# Patient Record
Sex: FEMALE | Race: WHITE | Employment: FULL TIME | URBAN - METROPOLITAN AREA
[De-identification: names, ages, dates, MRNs, and addresses within clinical notes are randomized per-mention and may not be internally consistent; named-entity substitution may affect disease eponyms.]

---

## 2020-01-30 ENCOUNTER — APPOINTMENT (OUTPATIENT)
Dept: GENERAL RADIOLOGY | Facility: HOSPITAL | Age: 50
DRG: 534 | End: 2020-01-30
Attending: NURSE PRACTITIONER
Payer: OTHER MISCELLANEOUS

## 2020-01-30 ENCOUNTER — HOSPITAL ENCOUNTER (INPATIENT)
Facility: HOSPITAL | Age: 50
LOS: 1 days | Discharge: ANOTHER HOSPITAL | DRG: 534 | End: 2020-01-31
Attending: INTERNAL MEDICINE | Admitting: INTERNAL MEDICINE
Payer: OTHER MISCELLANEOUS

## 2020-01-30 DIAGNOSIS — S82.832A CLOSED FRACTURE OF DISTAL END OF LEFT FIBULA, UNSPECIFIED FRACTURE MORPHOLOGY, INITIAL ENCOUNTER: ICD-10-CM

## 2020-01-30 DIAGNOSIS — S72.402A CLOSED FRACTURE OF DISTAL END OF LEFT FEMUR, UNSPECIFIED FRACTURE MORPHOLOGY, INITIAL ENCOUNTER (HCC): Primary | ICD-10-CM

## 2020-01-30 LAB
ANION GAP SERPL CALC-SCNC: 2 MMOL/L (ref 0–18)
BASOPHILS # BLD AUTO: 0.08 X10(3) UL (ref 0–0.2)
BASOPHILS NFR BLD AUTO: 0.4 %
BUN BLD-MCNC: 19 MG/DL (ref 7–18)
BUN/CREAT SERPL: 20.2 (ref 10–20)
CALCIUM BLD-MCNC: 9.1 MG/DL (ref 8.5–10.1)
CHLORIDE SERPL-SCNC: 111 MMOL/L (ref 98–112)
CO2 SERPL-SCNC: 25 MMOL/L (ref 21–32)
CREAT BLD-MCNC: 0.94 MG/DL (ref 0.55–1.02)
DEPRECATED RDW RBC AUTO: 39.5 FL (ref 35.1–46.3)
EOSINOPHIL # BLD AUTO: 0.12 X10(3) UL (ref 0–0.7)
EOSINOPHIL NFR BLD AUTO: 0.7 %
ERYTHROCYTE [DISTWIDTH] IN BLOOD BY AUTOMATED COUNT: 13.2 % (ref 11–15)
GLUCOSE BLD-MCNC: 150 MG/DL (ref 70–99)
HCT VFR BLD AUTO: 42.2 % (ref 35–48)
HGB BLD-MCNC: 14 G/DL (ref 12–16)
IMM GRANULOCYTES # BLD AUTO: 0.08 X10(3) UL (ref 0–1)
IMM GRANULOCYTES NFR BLD: 0.4 %
LYMPHOCYTES # BLD AUTO: 1.29 X10(3) UL (ref 1–4)
LYMPHOCYTES NFR BLD AUTO: 7.1 %
MCH RBC QN AUTO: 27.3 PG (ref 26–34)
MCHC RBC AUTO-ENTMCNC: 33.2 G/DL (ref 31–37)
MCV RBC AUTO: 82.4 FL (ref 80–100)
MONOCYTES # BLD AUTO: 0.66 X10(3) UL (ref 0.1–1)
MONOCYTES NFR BLD AUTO: 3.6 %
NEUTROPHILS # BLD AUTO: 16.05 X10 (3) UL (ref 1.5–7.7)
NEUTROPHILS # BLD AUTO: 16.05 X10(3) UL (ref 1.5–7.7)
NEUTROPHILS NFR BLD AUTO: 87.8 %
OSMOLALITY SERPL CALC.SUM OF ELEC: 291 MOSM/KG (ref 275–295)
PLATELET # BLD AUTO: 286 10(3)UL (ref 150–450)
POTASSIUM SERPL-SCNC: 4.7 MMOL/L (ref 3.5–5.1)
RBC # BLD AUTO: 5.12 X10(6)UL (ref 3.8–5.3)
SODIUM SERPL-SCNC: 138 MMOL/L (ref 136–145)
WBC # BLD AUTO: 18.3 X10(3) UL (ref 4–11)

## 2020-01-30 PROCEDURE — 93010 ELECTROCARDIOGRAM REPORT: CPT | Performed by: INTERNAL MEDICINE

## 2020-01-30 PROCEDURE — 96374 THER/PROPH/DIAG INJ IV PUSH: CPT

## 2020-01-30 PROCEDURE — 73610 X-RAY EXAM OF ANKLE: CPT | Performed by: NURSE PRACTITIONER

## 2020-01-30 PROCEDURE — 80048 BASIC METABOLIC PNL TOTAL CA: CPT | Performed by: NURSE PRACTITIONER

## 2020-01-30 PROCEDURE — 73560 X-RAY EXAM OF KNEE 1 OR 2: CPT | Performed by: NURSE PRACTITIONER

## 2020-01-30 PROCEDURE — 99285 EMERGENCY DEPT VISIT HI MDM: CPT

## 2020-01-30 PROCEDURE — 73502 X-RAY EXAM HIP UNI 2-3 VIEWS: CPT | Performed by: NURSE PRACTITIONER

## 2020-01-30 PROCEDURE — 85025 COMPLETE CBC W/AUTO DIFF WBC: CPT | Performed by: NURSE PRACTITIONER

## 2020-01-30 PROCEDURE — 93005 ELECTROCARDIOGRAM TRACING: CPT

## 2020-01-30 PROCEDURE — 73552 X-RAY EXAM OF FEMUR 2/>: CPT | Performed by: NURSE PRACTITIONER

## 2020-01-30 RX ORDER — IBUPROFEN 600 MG/1
600 TABLET ORAL ONCE
Status: DISCONTINUED | OUTPATIENT
Start: 2020-01-30 | End: 2020-01-30

## 2020-01-30 RX ORDER — ONDANSETRON 2 MG/ML
4 INJECTION INTRAMUSCULAR; INTRAVENOUS EVERY 6 HOURS PRN
Status: DISCONTINUED | OUTPATIENT
Start: 2020-01-30 | End: 2020-01-31

## 2020-01-30 RX ORDER — IBUPROFEN 600 MG/1
600 TABLET ORAL EVERY 6 HOURS PRN
Status: DISCONTINUED | OUTPATIENT
Start: 2020-01-30 | End: 2020-01-31

## 2020-01-30 RX ORDER — IPRATROPIUM BROMIDE AND ALBUTEROL SULFATE 2.5; .5 MG/3ML; MG/3ML
3 SOLUTION RESPIRATORY (INHALATION) EVERY 6 HOURS PRN
Status: DISCONTINUED | OUTPATIENT
Start: 2020-01-30 | End: 2020-01-31

## 2020-01-30 RX ORDER — LABETALOL HYDROCHLORIDE 5 MG/ML
10 INJECTION, SOLUTION INTRAVENOUS EVERY 4 HOURS PRN
Status: DISCONTINUED | OUTPATIENT
Start: 2020-01-30 | End: 2020-01-31

## 2020-01-30 RX ORDER — MONTELUKAST SODIUM 10 MG/1
10 TABLET ORAL DAILY
COMMUNITY
Start: 2019-11-06

## 2020-01-30 RX ORDER — FLUTICASONE PROPIONATE AND SALMETEROL 250; 50 UG/1; UG/1
1 POWDER RESPIRATORY (INHALATION) DAILY
COMMUNITY

## 2020-01-30 RX ORDER — MONTELUKAST SODIUM 10 MG/1
10 TABLET ORAL DAILY
Status: DISCONTINUED | OUTPATIENT
Start: 2020-01-30 | End: 2020-01-31

## 2020-01-30 RX ORDER — METOCLOPRAMIDE HYDROCHLORIDE 5 MG/ML
10 INJECTION INTRAMUSCULAR; INTRAVENOUS EVERY 8 HOURS PRN
Status: DISCONTINUED | OUTPATIENT
Start: 2020-01-30 | End: 2020-01-31

## 2020-01-30 RX ORDER — HYDROMORPHONE HYDROCHLORIDE 1 MG/ML
0.4 INJECTION, SOLUTION INTRAMUSCULAR; INTRAVENOUS; SUBCUTANEOUS EVERY 2 HOUR PRN
Status: DISCONTINUED | OUTPATIENT
Start: 2020-01-30 | End: 2020-01-31

## 2020-01-30 RX ORDER — BISACODYL 10 MG
10 SUPPOSITORY, RECTAL RECTAL
Status: DISCONTINUED | OUTPATIENT
Start: 2020-01-30 | End: 2020-01-31

## 2020-01-30 RX ORDER — TEMAZEPAM 7.5 MG/1
7.5 CAPSULE ORAL NIGHTLY PRN
Status: DISCONTINUED | OUTPATIENT
Start: 2020-01-30 | End: 2020-01-31

## 2020-01-30 RX ORDER — HYDROMORPHONE HYDROCHLORIDE 1 MG/ML
0.2 INJECTION, SOLUTION INTRAMUSCULAR; INTRAVENOUS; SUBCUTANEOUS EVERY 2 HOUR PRN
Status: DISCONTINUED | OUTPATIENT
Start: 2020-01-30 | End: 2020-01-31

## 2020-01-30 RX ORDER — MORPHINE SULFATE 4 MG/ML
4 INJECTION, SOLUTION INTRAMUSCULAR; INTRAVENOUS ONCE
Status: COMPLETED | OUTPATIENT
Start: 2020-01-30 | End: 2020-01-30

## 2020-01-30 RX ORDER — POLYETHYLENE GLYCOL 3350 17 G/17G
17 POWDER, FOR SOLUTION ORAL 2 TIMES DAILY
Status: DISCONTINUED | OUTPATIENT
Start: 2020-01-31 | End: 2020-01-31

## 2020-01-30 RX ORDER — SODIUM CHLORIDE 0.9 % (FLUSH) 0.9 %
3 SYRINGE (ML) INJECTION AS NEEDED
Status: DISCONTINUED | OUTPATIENT
Start: 2020-01-30 | End: 2020-01-31

## 2020-01-30 RX ORDER — IBUPROFEN 600 MG/1
600 TABLET ORAL ONCE
Status: COMPLETED | OUTPATIENT
Start: 2020-01-30 | End: 2020-01-30

## 2020-01-30 RX ORDER — HYDROCODONE BITARTRATE AND ACETAMINOPHEN 5; 325 MG/1; MG/1
2 TABLET ORAL EVERY 4 HOURS PRN
Status: DISCONTINUED | OUTPATIENT
Start: 2020-01-30 | End: 2020-01-31

## 2020-01-30 RX ORDER — ALBUTEROL SULFATE 2.5 MG/3ML
2.5 SOLUTION RESPIRATORY (INHALATION) EVERY 6 HOURS PRN
COMMUNITY

## 2020-01-30 RX ORDER — SODIUM PHOSPHATE, DIBASIC AND SODIUM PHOSPHATE, MONOBASIC 7; 19 G/133ML; G/133ML
1 ENEMA RECTAL ONCE AS NEEDED
Status: DISCONTINUED | OUTPATIENT
Start: 2020-01-30 | End: 2020-01-31

## 2020-01-30 RX ORDER — HYDROCODONE BITARTRATE AND ACETAMINOPHEN 5; 325 MG/1; MG/1
1 TABLET ORAL EVERY 4 HOURS PRN
Status: DISCONTINUED | OUTPATIENT
Start: 2020-01-30 | End: 2020-01-31

## 2020-01-30 RX ORDER — ACETAMINOPHEN 325 MG/1
650 TABLET ORAL EVERY 4 HOURS PRN
Status: DISCONTINUED | OUTPATIENT
Start: 2020-01-30 | End: 2020-01-31

## 2020-01-30 RX ORDER — HYDROMORPHONE HYDROCHLORIDE 1 MG/ML
0.8 INJECTION, SOLUTION INTRAMUSCULAR; INTRAVENOUS; SUBCUTANEOUS EVERY 2 HOUR PRN
Status: DISCONTINUED | OUTPATIENT
Start: 2020-01-30 | End: 2020-01-31

## 2020-01-30 RX ORDER — HYDRALAZINE HYDROCHLORIDE 20 MG/ML
10 INJECTION INTRAMUSCULAR; INTRAVENOUS EVERY 4 HOURS PRN
Status: DISCONTINUED | OUTPATIENT
Start: 2020-01-30 | End: 2020-01-31

## 2020-01-30 NOTE — ED NOTES
Pt in XR at this time. Pain to left hip, femur, knee and ankle after fall at work. Previous fracture noted to area. Scar in place after surgery. Imaging in process. Will medicate with ibuprofen.  at bedside. Ice in place.

## 2020-01-31 ENCOUNTER — ANESTHESIA (OUTPATIENT)
Dept: SURGERY | Facility: HOSPITAL | Age: 50
DRG: 534 | End: 2020-01-31
Payer: OTHER MISCELLANEOUS

## 2020-01-31 ENCOUNTER — ANESTHESIA EVENT (OUTPATIENT)
Dept: SURGERY | Facility: HOSPITAL | Age: 50
DRG: 534 | End: 2020-01-31
Payer: OTHER MISCELLANEOUS

## 2020-01-31 ENCOUNTER — HOSPITAL ENCOUNTER (INPATIENT)
Facility: HOSPITAL | Age: 50
LOS: 10 days | Discharge: HOME | DRG: 481 | End: 2020-02-10
Attending: INTERNAL MEDICINE | Admitting: INTERNAL MEDICINE
Payer: MEDICAID

## 2020-01-31 ENCOUNTER — APPOINTMENT (OUTPATIENT)
Dept: CT IMAGING | Facility: HOSPITAL | Age: 50
DRG: 534 | End: 2020-01-31
Attending: HOSPITALIST
Payer: OTHER MISCELLANEOUS

## 2020-01-31 VITALS
RESPIRATION RATE: 16 BRPM | DIASTOLIC BLOOD PRESSURE: 72 MMHG | OXYGEN SATURATION: 96 % | SYSTOLIC BLOOD PRESSURE: 152 MMHG | HEART RATE: 80 BPM | HEIGHT: 62 IN | BODY MASS INDEX: 41.38 KG/M2 | TEMPERATURE: 98 F | WEIGHT: 224.88 LBS

## 2020-01-31 PROBLEM — S72.90XA FEMUR FRACTURE (HCC): Status: ACTIVE | Noted: 2020-01-31

## 2020-01-31 LAB
ANION GAP SERPL CALC-SCNC: 4 MMOL/L (ref 0–18)
ANTIBODY SCREEN: NEGATIVE
B-HCG UR QL: NEGATIVE
BASOPHILS # BLD AUTO: 0.06 X10(3) UL (ref 0–0.2)
BASOPHILS NFR BLD AUTO: 0.7 %
BUN BLD-MCNC: 17 MG/DL (ref 7–18)
BUN/CREAT SERPL: 26.6 (ref 10–20)
CALCIUM BLD-MCNC: 8.4 MG/DL (ref 8.5–10.1)
CHLORIDE SERPL-SCNC: 110 MMOL/L (ref 98–112)
CO2 SERPL-SCNC: 28 MMOL/L (ref 21–32)
CREAT BLD-MCNC: 0.64 MG/DL (ref 0.55–1.02)
DEPRECATED RDW RBC AUTO: 40.5 FL (ref 35.1–46.3)
EOSINOPHIL # BLD AUTO: 0.19 X10(3) UL (ref 0–0.7)
EOSINOPHIL NFR BLD AUTO: 2.1 %
ERYTHROCYTE [DISTWIDTH] IN BLOOD BY AUTOMATED COUNT: 13.4 % (ref 11–15)
GLUCOSE BLD-MCNC: 99 MG/DL (ref 70–99)
HAV IGM SER QL: 2 MG/DL (ref 1.6–2.6)
HCT VFR BLD AUTO: 37.9 % (ref 35–48)
HGB BLD-MCNC: 12.3 G/DL (ref 12–16)
IMM GRANULOCYTES # BLD AUTO: 0.03 X10(3) UL (ref 0–1)
IMM GRANULOCYTES NFR BLD: 0.3 %
INR BLD: 1.05 (ref 0.9–1.2)
LYMPHOCYTES # BLD AUTO: 1.83 X10(3) UL (ref 1–4)
LYMPHOCYTES NFR BLD AUTO: 20.1 %
MCH RBC QN AUTO: 27 PG (ref 26–34)
MCHC RBC AUTO-ENTMCNC: 32.5 G/DL (ref 31–37)
MCV RBC AUTO: 83.3 FL (ref 80–100)
MONOCYTES # BLD AUTO: 0.8 X10(3) UL (ref 0.1–1)
MONOCYTES NFR BLD AUTO: 8.8 %
MRSA DNA SPEC QL NAA+PROBE: NEGATIVE
NEUTROPHILS # BLD AUTO: 6.19 X10 (3) UL (ref 1.5–7.7)
NEUTROPHILS # BLD AUTO: 6.19 X10(3) UL (ref 1.5–7.7)
NEUTROPHILS NFR BLD AUTO: 68 %
OSMOLALITY SERPL CALC.SUM OF ELEC: 296 MOSM/KG (ref 275–295)
PLATELET # BLD AUTO: 246 10(3)UL (ref 150–450)
POTASSIUM SERPL-SCNC: 3.7 MMOL/L (ref 3.5–5.1)
PROTHROMBIN TIME: 13.5 SECONDS (ref 11.8–14.5)
RBC # BLD AUTO: 4.55 X10(6)UL (ref 3.8–5.3)
RH BLOOD TYPE: POSITIVE
SODIUM SERPL-SCNC: 142 MMOL/L (ref 136–145)
WBC # BLD AUTO: 9.1 X10(3) UL (ref 4–11)

## 2020-01-31 PROCEDURE — 83735 ASSAY OF MAGNESIUM: CPT | Performed by: INTERNAL MEDICINE

## 2020-01-31 PROCEDURE — 86850 RBC ANTIBODY SCREEN: CPT | Performed by: INTERNAL MEDICINE

## 2020-01-31 PROCEDURE — 81025 URINE PREGNANCY TEST: CPT | Performed by: INTERNAL MEDICINE

## 2020-01-31 PROCEDURE — 80048 BASIC METABOLIC PNL TOTAL CA: CPT | Performed by: INTERNAL MEDICINE

## 2020-01-31 PROCEDURE — 86901 BLOOD TYPING SEROLOGIC RH(D): CPT | Performed by: INTERNAL MEDICINE

## 2020-01-31 PROCEDURE — 85610 PROTHROMBIN TIME: CPT | Performed by: INTERNAL MEDICINE

## 2020-01-31 PROCEDURE — 73701 CT LOWER EXTREMITY W/DYE: CPT | Performed by: HOSPITALIST

## 2020-01-31 PROCEDURE — 87641 MR-STAPH DNA AMP PROBE: CPT | Performed by: INTERNAL MEDICINE

## 2020-01-31 PROCEDURE — 85025 COMPLETE CBC W/AUTO DIFF WBC: CPT | Performed by: INTERNAL MEDICINE

## 2020-01-31 PROCEDURE — 86900 BLOOD TYPING SEROLOGIC ABO: CPT | Performed by: INTERNAL MEDICINE

## 2020-01-31 RX ORDER — MORPHINE SULFATE 2 MG/ML
2 INJECTION, SOLUTION INTRAMUSCULAR; INTRAVENOUS EVERY 10 MIN PRN
Status: CANCELLED | OUTPATIENT
Start: 2020-01-31

## 2020-01-31 RX ORDER — MORPHINE SULFATE 4 MG/ML
4 INJECTION, SOLUTION INTRAMUSCULAR; INTRAVENOUS EVERY 2 HOUR PRN
Status: DISCONTINUED | OUTPATIENT
Start: 2020-01-31 | End: 2020-02-10

## 2020-01-31 RX ORDER — PROCHLORPERAZINE EDISYLATE 5 MG/ML
5 INJECTION INTRAMUSCULAR; INTRAVENOUS ONCE AS NEEDED
Status: CANCELLED | OUTPATIENT
Start: 2020-01-31 | End: 2020-01-31

## 2020-01-31 RX ORDER — SODIUM CHLORIDE, SODIUM LACTATE, POTASSIUM CHLORIDE, CALCIUM CHLORIDE 600; 310; 30; 20 MG/100ML; MG/100ML; MG/100ML; MG/100ML
INJECTION, SOLUTION INTRAVENOUS CONTINUOUS
Status: DISCONTINUED | OUTPATIENT
Start: 2020-01-31 | End: 2020-01-31

## 2020-01-31 RX ORDER — MONTELUKAST SODIUM 10 MG/1
10 TABLET ORAL NIGHTLY
Status: DISCONTINUED | OUTPATIENT
Start: 2020-01-31 | End: 2020-02-10

## 2020-01-31 RX ORDER — ACETAMINOPHEN 500 MG
1000 TABLET ORAL ONCE
Status: COMPLETED | OUTPATIENT
Start: 2020-01-31 | End: 2020-01-31

## 2020-01-31 RX ORDER — SODIUM CHLORIDE 9 MG/ML
INJECTION, SOLUTION INTRAVENOUS CONTINUOUS
Status: DISCONTINUED | OUTPATIENT
Start: 2020-01-31 | End: 2020-02-10

## 2020-01-31 RX ORDER — SODIUM CHLORIDE, SODIUM LACTATE, POTASSIUM CHLORIDE, CALCIUM CHLORIDE 600; 310; 30; 20 MG/100ML; MG/100ML; MG/100ML; MG/100ML
INJECTION, SOLUTION INTRAVENOUS CONTINUOUS
Status: CANCELLED | OUTPATIENT
Start: 2020-01-31

## 2020-01-31 RX ORDER — HYDROMORPHONE HYDROCHLORIDE 1 MG/ML
0.4 INJECTION, SOLUTION INTRAMUSCULAR; INTRAVENOUS; SUBCUTANEOUS EVERY 5 MIN PRN
Status: CANCELLED | OUTPATIENT
Start: 2020-01-31

## 2020-01-31 RX ORDER — ALBUTEROL SULFATE 2.5 MG/3ML
2.5 SOLUTION RESPIRATORY (INHALATION) EVERY 6 HOURS PRN
Status: DISCONTINUED | OUTPATIENT
Start: 2020-01-31 | End: 2020-02-10

## 2020-01-31 RX ORDER — HALOPERIDOL 5 MG/ML
0.25 INJECTION INTRAMUSCULAR ONCE AS NEEDED
Status: CANCELLED | OUTPATIENT
Start: 2020-01-31 | End: 2020-01-31

## 2020-01-31 RX ORDER — ONDANSETRON 2 MG/ML
4 INJECTION INTRAMUSCULAR; INTRAVENOUS EVERY 6 HOURS PRN
Status: DISCONTINUED | OUTPATIENT
Start: 2020-01-31 | End: 2020-02-01

## 2020-01-31 RX ORDER — METOCLOPRAMIDE 10 MG/1
10 TABLET ORAL ONCE
Status: COMPLETED | OUTPATIENT
Start: 2020-01-31 | End: 2020-01-31

## 2020-01-31 RX ORDER — MORPHINE SULFATE 10 MG/ML
6 INJECTION, SOLUTION INTRAMUSCULAR; INTRAVENOUS EVERY 10 MIN PRN
Status: CANCELLED | OUTPATIENT
Start: 2020-01-31

## 2020-01-31 RX ORDER — ONDANSETRON 2 MG/ML
4 INJECTION INTRAMUSCULAR; INTRAVENOUS ONCE AS NEEDED
Status: CANCELLED | OUTPATIENT
Start: 2020-01-31 | End: 2020-01-31

## 2020-01-31 RX ORDER — FAMOTIDINE 20 MG/1
20 TABLET ORAL ONCE
Status: COMPLETED | OUTPATIENT
Start: 2020-01-31 | End: 2020-01-31

## 2020-01-31 RX ORDER — HYDROMORPHONE HYDROCHLORIDE 1 MG/ML
0.2 INJECTION, SOLUTION INTRAMUSCULAR; INTRAVENOUS; SUBCUTANEOUS EVERY 5 MIN PRN
Status: CANCELLED | OUTPATIENT
Start: 2020-01-31

## 2020-01-31 RX ORDER — MORPHINE SULFATE 4 MG/ML
4 INJECTION, SOLUTION INTRAMUSCULAR; INTRAVENOUS EVERY 10 MIN PRN
Status: CANCELLED | OUTPATIENT
Start: 2020-01-31

## 2020-01-31 RX ORDER — HYDROCODONE BITARTRATE AND ACETAMINOPHEN 5; 325 MG/1; MG/1
1 TABLET ORAL EVERY 4 HOURS PRN
Status: DISCONTINUED | OUTPATIENT
Start: 2020-01-31 | End: 2020-02-10

## 2020-01-31 RX ORDER — CEFAZOLIN SODIUM/WATER 2 G/20 ML
2 SYRINGE (ML) INTRAVENOUS ONCE
Status: COMPLETED | OUTPATIENT
Start: 2020-02-01 | End: 2020-02-01

## 2020-01-31 RX ORDER — METOCLOPRAMIDE HYDROCHLORIDE 5 MG/ML
10 INJECTION INTRAMUSCULAR; INTRAVENOUS EVERY 8 HOURS PRN
Status: DISCONTINUED | OUTPATIENT
Start: 2020-01-31 | End: 2020-02-10

## 2020-01-31 RX ORDER — NALOXONE HYDROCHLORIDE 0.4 MG/ML
80 INJECTION, SOLUTION INTRAMUSCULAR; INTRAVENOUS; SUBCUTANEOUS AS NEEDED
Status: CANCELLED | OUTPATIENT
Start: 2020-01-31 | End: 2020-01-31

## 2020-01-31 RX ORDER — BISACODYL 10 MG
10 SUPPOSITORY, RECTAL RECTAL
Status: DISCONTINUED | OUTPATIENT
Start: 2020-01-31 | End: 2020-02-01

## 2020-01-31 RX ORDER — ACETAMINOPHEN 325 MG/1
650 TABLET ORAL EVERY 4 HOURS PRN
Status: DISCONTINUED | OUTPATIENT
Start: 2020-01-31 | End: 2020-02-10

## 2020-01-31 RX ORDER — HYDROMORPHONE HYDROCHLORIDE 1 MG/ML
0.6 INJECTION, SOLUTION INTRAMUSCULAR; INTRAVENOUS; SUBCUTANEOUS EVERY 5 MIN PRN
Status: CANCELLED | OUTPATIENT
Start: 2020-01-31

## 2020-01-31 RX ORDER — SODIUM PHOSPHATE, DIBASIC AND SODIUM PHOSPHATE, MONOBASIC 7; 19 G/133ML; G/133ML
1 ENEMA RECTAL ONCE AS NEEDED
Status: DISCONTINUED | OUTPATIENT
Start: 2020-01-31 | End: 2020-02-01

## 2020-01-31 RX ORDER — POLYETHYLENE GLYCOL 3350 17 G/17G
17 POWDER, FOR SOLUTION ORAL DAILY PRN
Status: DISCONTINUED | OUTPATIENT
Start: 2020-01-31 | End: 2020-02-01

## 2020-01-31 RX ORDER — MORPHINE SULFATE 4 MG/ML
1 INJECTION, SOLUTION INTRAMUSCULAR; INTRAVENOUS EVERY 2 HOUR PRN
Status: DISCONTINUED | OUTPATIENT
Start: 2020-01-31 | End: 2020-02-10

## 2020-01-31 RX ORDER — HYDROCODONE BITARTRATE AND ACETAMINOPHEN 5; 325 MG/1; MG/1
2 TABLET ORAL EVERY 4 HOURS PRN
Status: DISCONTINUED | OUTPATIENT
Start: 2020-01-31 | End: 2020-02-10

## 2020-01-31 RX ORDER — HYDROCODONE BITARTRATE AND ACETAMINOPHEN 5; 325 MG/1; MG/1
1 TABLET ORAL AS NEEDED
Status: CANCELLED | OUTPATIENT
Start: 2020-01-31

## 2020-01-31 RX ORDER — HYDROCODONE BITARTRATE AND ACETAMINOPHEN 5; 325 MG/1; MG/1
2 TABLET ORAL AS NEEDED
Status: CANCELLED | OUTPATIENT
Start: 2020-01-31

## 2020-01-31 RX ORDER — DOCUSATE SODIUM 100 MG/1
100 CAPSULE, LIQUID FILLED ORAL 2 TIMES DAILY
Status: DISCONTINUED | OUTPATIENT
Start: 2020-01-31 | End: 2020-02-01

## 2020-01-31 RX ORDER — MORPHINE SULFATE 4 MG/ML
2 INJECTION, SOLUTION INTRAMUSCULAR; INTRAVENOUS EVERY 2 HOUR PRN
Status: DISCONTINUED | OUTPATIENT
Start: 2020-01-31 | End: 2020-02-10

## 2020-01-31 NOTE — PLAN OF CARE
Problem: Patient Centered Care  Goal: Patient preferences are identified and integrated in the patient's plan of care  Description  Interventions:  - What would you like us to know as we care for you? I live with my  and have no children.  I work a hematoma  - Assess quality of pulses, skin color and temperature  - Assess for signs of decreased coronary artery perfusion - ex.  Angina  - Evaluate fluid balance, assess for edema, trend weights  Outcome: Progressing     Problem: METABOLIC/FLUID AND ELECT integrity remains intact  Description  INTERVENTIONS  - Assess and document risk factors for pressure ulcer development  - Assess and document skin integrity  - Monitor for areas of redness and/or skin breakdown  - Initiate interventions, skin care algorit

## 2020-01-31 NOTE — H&P
[facility] Hospitalist H&P       CC: Patient presents with:  Fall       PCP: Oliver Giron MD    History of Present Illness: Patient is a 44year old female with PMH sig for asthma controlled with advair and singular, and hs of left femur fracture at age 11, wh Throat: Lips, mucosa, and tongue normal. Teeth and gums normal.   Neck: Supple    Lungs:   Clear to auscultation bilaterally. Normal effort   Chest wall:  No tenderness or deformity. Heart:  Regular rate and rhythm,     Abdomen:   Soft, non-tender.  [place] Xr Knee (1 Or 2 Views), Left (cpt=73560)    Result Date: 1/30/2020  CONCLUSION:  1. Please see combined report with the x-ray of the left femur of the same day.     Dictated by (CST): Nikki Jimenes MD on 1/30/2020 at 18:55     Approved by (CST): Rome Lantigua patient's clinical course.   [facility] hospitalist to continue to follow patient while in house    Patient and/or patient's family given opportunity to ask questions and note understanding and agreeing with therapeutic plan as outlined    Thank Christine Jansen MD

## 2020-01-31 NOTE — CONSULTS
[facility]  Orthopedic Surgery  Report of Consultation    Beatrice Pimentel Patient Status:  Inpatient    1970 MRN [id number]   Location [facility] 4W/SW/SE Attending Kike Castillo MD   Hosp Day # 1 PCP Latoya Lozano MD     Reason for C 11/6/19, End Date , Taking? , Authorizing Provider External/Patient, Reported        Current Facility-Administered Medications:   •  lactated ringers infusion, , Intravenous, Continuous  •  ceFAZolin in NS (ANCEF) 3g/100mL IVPB premix, 3 g, Intravenous, On systems was negative.     Physical Exam:  NAD, AOx3  Left femur:  Mild swelling, pain with any left leg ROM  Firing quadriceps  Grossly NVID    Left ankle:  Splint in place  Mild tenderness and swelling overlying lateral malleolus  Wiggles toes  SILT over t

## 2020-01-31 NOTE — ED NOTES
MD Yesika Friedman at bedside to inform pt of the results. Last PO intake was 1:45pm and a sip of water with motrin. Pain unchanged.

## 2020-01-31 NOTE — OR PREOP
Patient in pre-op being sent up to 413, per  case was cancelled. Report was [facility] to [facility] [id number]. Family with patient at bedside, aware of plan. Patient vitals stable, no pain.

## 2020-01-31 NOTE — PLAN OF CARE
Pt npo for OR, had CT of left leg first. Pt denies pain, left leg in post-mold with ace wrap, CMS good to toes. Family at bedside and went along with to preop holding.        Problem: Patient Centered Care  Goal: Patient preferences are identified and integ decreased cardiac output  - Evaluate effectiveness of vasoactive medications to optimize hemodynamic stability  - Monitor arterial and/or venous puncture sites for bleeding and/or hematoma  - Assess quality of pulses, skin color and temperature  - Assess f Fluid restriction as ordered  - Instruct patient on fluid and nutrition restrictions as appropriate  Outcome: Progressing     Problem: SKIN/TISSUE INTEGRITY - ADULT  Goal: Skin integrity remains intact  Description  INTERVENTIONS  - Assess and document ris

## 2020-01-31 NOTE — ANESTHESIA PREPROCEDURE EVALUATION
Anesthesia PreOp Note    HPI:     Vin Sharp is a 44year old female who presents for preoperative consultation requested by: Orvan Hodgkin, MD    Date of Surgery: 1/30/2020 - 1/31/2020    Procedure(s):   FEMUR OPEN REDUCTION INTERNAL FIXATION/ EX FIX  A [MAR Hold] acetaminophen (TYLENOL) tab 650 mg, 650 mg, Oral, Q4H PRN, Alan Walker DO, 650 mg at 01/30/20 2326    Or  [MAR Hold] HYDROcodone-acetaminophen (NORCO) 5-325 MG per tab 1 tablet, 1 tablet, Oral, Q4H PRN, Alan Walker DO    Or  Ifeanyi Esteves Years of education: Not on file      Highest education level: Not on file    Occupational History      Not on file    Social Needs      Financial resource strain: Not on file      Food insecurity:        Worry: Not on file        Inability: Not on file Body mass index is 41.28 kg/m². height is 1.575 m (5' 2\") and weight is 102.4 kg (225 lb 11.2 oz). Her oral temperature is 98.3 °F (36.8 °C). Her blood pressure is 153/72 and her pulse is 84. Her respiration is 14 and oxygen saturation is 95%.     01/31/

## 2020-01-31 NOTE — CM/SW NOTE
[facility] Transfer  CM was notified by Dr. Monnie Goodell to assist in facilitating transfer to a higher level of care.   I spoke with Bettylou Paget at [facility] (ext [phone]) to begin coronation of transfer and for patient care to be assumed under Dr. Mitzi Caicedo

## 2020-01-31 NOTE — ED NOTES
Followed up with inpatient nurse regarding inpatient bed. No bed to room. Awaiting bed to transfer pt to pod 5.

## 2020-01-31 NOTE — DISCHARGE SUMMARY
General Medicine Discharge Summary     Patient ID:  Roxie Davis  44year old  5/12/1970    Admit date: 1/30/2020    Discharge date and time: 1/31/2020    Attending Physician: Sheela Rollins MD     Consults: IP CONSULT TO HOSPITALIST  IP CONSULT TO [address] [place] [address] fracture with poss pathologic fracture  - Noted on xray, poss pathologic fx per radiology, CT without evidence of lesions but notable fx near existing plate, per ortho recommended surgery with Dr. Divya Ambrose at [facility], plan for tx to [facility] for further m 18:54          Xr Knee (1 Or 2 Views), Left (cpt=73560)    Result Date: 1/30/2020  CONCLUSION:  1. Please see combined report with the x-ray of the left femur of the same day.     Dictated by (CST): Johann Andres MD on 1/30/2020 at 18:55     Approved by (C Care: Greater than 30 minutes    Patient had opportunity to ask questions and state understand and agree with therapeutic plan as outlined    Thank Mary Anthony M.D.  [facility] Hospitalist  Pager

## 2020-01-31 NOTE — ED PROVIDER NOTES
Patient Seen in: [facility] AND [facility] Emergency Department      History   Patient presents with:  Fall    Stated Complaint: fall    HPI    42-year-old female, with history of asthma, presents to the emergency department by EMS after a slip and fall at work. Rate and Rhythm: Normal rate. Pulses: Normal pulses. Heart sounds: No murmur. Pulmonary:      Effort: Pulmonary effort is normal.      Breath sounds: Normal breath sounds. Abdominal:      Palpations: Abdomen is soft. Tenderness:  There i CBC W/ DIFFERENTIAL[[id number]]          Abnormal            Final result                 Please view results for these tests on the individual orders.      Declines pain medication states was given ibuprofen by the nurse  X-rays are pending femur x-ray as diagnosis)  Closed fracture of distal end of left fibula, unspecified fracture morphology, initial encounter    Disposition:  Admit  1/30/2020  8:12 pm    Follow-up:  No follow-up provider specified.       Medications Prescribed:  Current Discharge [facility] none

## 2020-02-01 ENCOUNTER — APPOINTMENT (OUTPATIENT)
Dept: GENERAL RADIOLOGY | Facility: HOSPITAL | Age: 50
DRG: 481 | End: 2020-02-01
Attending: ORTHOPAEDIC SURGERY
Payer: MEDICAID

## 2020-02-01 LAB
ANION GAP SERPL CALC-SCNC: 5 MMOL/L (ref 0–18)
BASOPHILS # BLD AUTO: 0.06 X10(3) UL (ref 0–0.2)
BASOPHILS NFR BLD AUTO: 0.6 %
BUN BLD-MCNC: 17 MG/DL (ref 7–18)
BUN/CREAT SERPL: 28.8 (ref 10–20)
CALCIUM BLD-MCNC: 9.3 MG/DL (ref 8.5–10.1)
CHLORIDE SERPL-SCNC: 110 MMOL/L (ref 98–112)
CO2 SERPL-SCNC: 26 MMOL/L (ref 21–32)
CREAT BLD-MCNC: 0.59 MG/DL (ref 0.55–1.02)
DEPRECATED RDW RBC AUTO: 40.3 FL (ref 35.1–46.3)
EOSINOPHIL # BLD AUTO: 0.31 X10(3) UL (ref 0–0.7)
EOSINOPHIL NFR BLD AUTO: 3.3 %
ERYTHROCYTE [DISTWIDTH] IN BLOOD BY AUTOMATED COUNT: 13.2 % (ref 11–15)
GLUCOSE BLD-MCNC: 94 MG/DL (ref 70–99)
HAV IGM SER QL: 2.1 MG/DL (ref 1.6–2.6)
HCT VFR BLD AUTO: 39.5 % (ref 35–48)
HGB BLD-MCNC: 13 G/DL (ref 12–16)
IMM GRANULOCYTES # BLD AUTO: 0.05 X10(3) UL (ref 0–1)
IMM GRANULOCYTES NFR BLD: 0.5 %
INR BLD: 0.98 (ref 0.9–1.1)
LYMPHOCYTES # BLD AUTO: 1.69 X10(3) UL (ref 1–4)
LYMPHOCYTES NFR BLD AUTO: 17.8 %
MCH RBC QN AUTO: 27.5 PG (ref 26–34)
MCHC RBC AUTO-ENTMCNC: 32.9 G/DL (ref 31–37)
MCV RBC AUTO: 83.7 FL (ref 80–100)
MONOCYTES # BLD AUTO: 0.84 X10(3) UL (ref 0.1–1)
MONOCYTES NFR BLD AUTO: 8.8 %
NEUTROPHILS # BLD AUTO: 6.55 X10 (3) UL (ref 1.5–7.7)
NEUTROPHILS # BLD AUTO: 6.55 X10(3) UL (ref 1.5–7.7)
NEUTROPHILS NFR BLD AUTO: 69 %
OSMOLALITY SERPL CALC.SUM OF ELEC: 293 MOSM/KG (ref 275–295)
PLATELET # BLD AUTO: 239 10(3)UL (ref 150–450)
POTASSIUM SERPL-SCNC: 3.7 MMOL/L (ref 3.5–5.1)
PSA SERPL DL<=0.01 NG/ML-MCNC: 13.4 SECONDS (ref 12.5–14.7)
RBC # BLD AUTO: 4.72 X10(6)UL (ref 3.8–5.3)
SODIUM SERPL-SCNC: 141 MMOL/L (ref 136–145)
WBC # BLD AUTO: 9.5 X10(3) UL (ref 4–11)

## 2020-02-01 PROCEDURE — 0QSC06Z REPOSITION LEFT LOWER FEMUR WITH INTRAMEDULLARY INTERNAL FIXATION DEVICE, OPEN APPROACH: ICD-10-PCS | Performed by: ORTHOPAEDIC SURGERY

## 2020-02-01 PROCEDURE — 83735 ASSAY OF MAGNESIUM: CPT | Performed by: HOSPITALIST

## 2020-02-01 PROCEDURE — 88300 SURGICAL PATH GROSS: CPT | Performed by: ORTHOPAEDIC SURGERY

## 2020-02-01 PROCEDURE — 85025 COMPLETE CBC W/AUTO DIFF WBC: CPT | Performed by: INTERNAL MEDICINE

## 2020-02-01 PROCEDURE — 94640 AIRWAY INHALATION TREATMENT: CPT

## 2020-02-01 PROCEDURE — 0QPC04Z REMOVAL OF INTERNAL FIXATION DEVICE FROM LEFT LOWER FEMUR, OPEN APPROACH: ICD-10-PCS | Performed by: ORTHOPAEDIC SURGERY

## 2020-02-01 PROCEDURE — 2W3RX2Z IMMOBILIZATION OF LEFT LOWER LEG USING CAST: ICD-10-PCS | Performed by: ORTHOPAEDIC SURGERY

## 2020-02-01 PROCEDURE — 80048 BASIC METABOLIC PNL TOTAL CA: CPT | Performed by: ORTHOPAEDIC SURGERY

## 2020-02-01 PROCEDURE — 85610 PROTHROMBIN TIME: CPT | Performed by: ORTHOPAEDIC SURGERY

## 2020-02-01 PROCEDURE — 3E0R3BZ INTRODUCTION OF ANESTHETIC AGENT INTO SPINAL CANAL, PERCUTANEOUS APPROACH: ICD-10-PCS | Performed by: ANESTHESIOLOGY

## 2020-02-01 PROCEDURE — 76000 FLUOROSCOPY <1 HR PHYS/QHP: CPT | Performed by: ORTHOPAEDIC SURGERY

## 2020-02-01 DEVICE — SCREW CORT FA 5.0X35 Z NAIL: Type: IMPLANTABLE DEVICE | Site: FEMUR | Status: FUNCTIONAL

## 2020-02-01 RX ORDER — DEXTROSE AND SODIUM CHLORIDE 5; .45 G/100ML; G/100ML
INJECTION, SOLUTION INTRAVENOUS CONTINUOUS
Status: DISCONTINUED | OUTPATIENT
Start: 2020-02-01 | End: 2020-02-10

## 2020-02-01 RX ORDER — NALOXONE HYDROCHLORIDE 0.4 MG/ML
80 INJECTION, SOLUTION INTRAMUSCULAR; INTRAVENOUS; SUBCUTANEOUS AS NEEDED
Status: DISCONTINUED | OUTPATIENT
Start: 2020-02-01 | End: 2020-02-01 | Stop reason: HOSPADM

## 2020-02-01 RX ORDER — SODIUM CHLORIDE, SODIUM LACTATE, POTASSIUM CHLORIDE, CALCIUM CHLORIDE 600; 310; 30; 20 MG/100ML; MG/100ML; MG/100ML; MG/100ML
INJECTION, SOLUTION INTRAVENOUS CONTINUOUS
Status: DISCONTINUED | OUTPATIENT
Start: 2020-02-01 | End: 2020-02-01 | Stop reason: HOSPADM

## 2020-02-01 RX ORDER — KETOROLAC TROMETHAMINE 15 MG/ML
15 INJECTION, SOLUTION INTRAMUSCULAR; INTRAVENOUS EVERY 6 HOURS
Status: COMPLETED | OUTPATIENT
Start: 2020-02-01 | End: 2020-02-02

## 2020-02-01 RX ORDER — HYDROMORPHONE HYDROCHLORIDE 1 MG/ML
0.8 INJECTION, SOLUTION INTRAMUSCULAR; INTRAVENOUS; SUBCUTANEOUS EVERY 2 HOUR PRN
Status: DISCONTINUED | OUTPATIENT
Start: 2020-02-01 | End: 2020-02-01

## 2020-02-01 RX ORDER — ACETAMINOPHEN 325 MG/1
650 TABLET ORAL 4 TIMES DAILY
Status: DISCONTINUED | OUTPATIENT
Start: 2020-02-01 | End: 2020-02-01

## 2020-02-01 RX ORDER — OXYCODONE HYDROCHLORIDE 15 MG/1
15 TABLET ORAL EVERY 4 HOURS PRN
Status: DISCONTINUED | OUTPATIENT
Start: 2020-02-01 | End: 2020-02-01

## 2020-02-01 RX ORDER — SODIUM CHLORIDE, SODIUM LACTATE, POTASSIUM CHLORIDE, CALCIUM CHLORIDE 600; 310; 30; 20 MG/100ML; MG/100ML; MG/100ML; MG/100ML
INJECTION, SOLUTION INTRAVENOUS CONTINUOUS PRN
Status: DISCONTINUED | OUTPATIENT
Start: 2020-02-01 | End: 2020-02-01 | Stop reason: SURG

## 2020-02-01 RX ORDER — TIZANIDINE 4 MG/1
4 TABLET ORAL 3 TIMES DAILY PRN
Status: DISCONTINUED | OUTPATIENT
Start: 2020-02-01 | End: 2020-02-01

## 2020-02-01 RX ORDER — TIZANIDINE 2 MG/1
2 TABLET ORAL 3 TIMES DAILY PRN
Status: DISCONTINUED | OUTPATIENT
Start: 2020-02-01 | End: 2020-02-10

## 2020-02-01 RX ORDER — HYDROMORPHONE HYDROCHLORIDE 1 MG/ML
0.4 INJECTION, SOLUTION INTRAMUSCULAR; INTRAVENOUS; SUBCUTANEOUS EVERY 2 HOUR PRN
Status: DISCONTINUED | OUTPATIENT
Start: 2020-02-01 | End: 2020-02-01

## 2020-02-01 RX ORDER — OXYCODONE HYDROCHLORIDE 10 MG/1
10 TABLET ORAL EVERY 4 HOURS PRN
Status: DISCONTINUED | OUTPATIENT
Start: 2020-02-01 | End: 2020-02-01

## 2020-02-01 RX ORDER — ONDANSETRON 2 MG/ML
4 INJECTION INTRAMUSCULAR; INTRAVENOUS EVERY 6 HOURS PRN
Status: DISCONTINUED | OUTPATIENT
Start: 2020-02-01 | End: 2020-02-10

## 2020-02-01 RX ORDER — SODIUM PHOSPHATE, DIBASIC AND SODIUM PHOSPHATE, MONOBASIC 7; 19 G/133ML; G/133ML
1 ENEMA RECTAL ONCE AS NEEDED
Status: DISCONTINUED | OUTPATIENT
Start: 2020-02-01 | End: 2020-02-10

## 2020-02-01 RX ORDER — POLYETHYLENE GLYCOL 3350 17 G/17G
17 POWDER, FOR SOLUTION ORAL DAILY PRN
Status: DISCONTINUED | OUTPATIENT
Start: 2020-02-01 | End: 2020-02-10

## 2020-02-01 RX ORDER — HYDROMORPHONE HYDROCHLORIDE 1 MG/ML
0.2 INJECTION, SOLUTION INTRAMUSCULAR; INTRAVENOUS; SUBCUTANEOUS EVERY 2 HOUR PRN
Status: DISCONTINUED | OUTPATIENT
Start: 2020-02-01 | End: 2020-02-01

## 2020-02-01 RX ORDER — ONDANSETRON 2 MG/ML
4 INJECTION INTRAMUSCULAR; INTRAVENOUS AS NEEDED
Status: DISCONTINUED | OUTPATIENT
Start: 2020-02-01 | End: 2020-02-01 | Stop reason: HOSPADM

## 2020-02-01 RX ORDER — MIDAZOLAM HYDROCHLORIDE 1 MG/ML
1 INJECTION INTRAMUSCULAR; INTRAVENOUS EVERY 5 MIN PRN
Status: DISCONTINUED | OUTPATIENT
Start: 2020-02-01 | End: 2020-02-01 | Stop reason: HOSPADM

## 2020-02-01 RX ORDER — BISACODYL 10 MG
10 SUPPOSITORY, RECTAL RECTAL
Status: DISCONTINUED | OUTPATIENT
Start: 2020-02-01 | End: 2020-02-10

## 2020-02-01 RX ORDER — DOCUSATE SODIUM 100 MG/1
100 CAPSULE, LIQUID FILLED ORAL 2 TIMES DAILY
Status: DISCONTINUED | OUTPATIENT
Start: 2020-02-01 | End: 2020-02-10

## 2020-02-01 RX ORDER — ACETAMINOPHEN 325 MG/1
650 TABLET ORAL ONCE
Status: DISCONTINUED | OUTPATIENT
Start: 2020-02-01 | End: 2020-02-01 | Stop reason: HOSPADM

## 2020-02-01 RX ORDER — POTASSIUM CHLORIDE 20 MEQ/1
40 TABLET, EXTENDED RELEASE ORAL ONCE
Status: COMPLETED | OUTPATIENT
Start: 2020-02-01 | End: 2020-02-01

## 2020-02-01 RX ORDER — CEFAZOLIN SODIUM/WATER 2 G/20 ML
2 SYRINGE (ML) INTRAVENOUS EVERY 8 HOURS
Status: COMPLETED | OUTPATIENT
Start: 2020-02-01 | End: 2020-02-02

## 2020-02-01 RX ORDER — MIDAZOLAM HYDROCHLORIDE 1 MG/ML
INJECTION INTRAMUSCULAR; INTRAVENOUS AS NEEDED
Status: DISCONTINUED | OUTPATIENT
Start: 2020-02-01 | End: 2020-02-01 | Stop reason: SURG

## 2020-02-01 RX ORDER — OXYCODONE HYDROCHLORIDE 5 MG/1
5 TABLET ORAL EVERY 4 HOURS PRN
Status: DISCONTINUED | OUTPATIENT
Start: 2020-02-01 | End: 2020-02-01

## 2020-02-01 RX ORDER — HYDROCODONE BITARTRATE AND ACETAMINOPHEN 5; 325 MG/1; MG/1
2 TABLET ORAL EVERY 4 HOURS PRN
Qty: 40 TABLET | Refills: 0 | Status: SHIPPED | OUTPATIENT
Start: 2020-02-01

## 2020-02-01 RX ORDER — DIPHENHYDRAMINE HYDROCHLORIDE 50 MG/ML
12.5 INJECTION INTRAMUSCULAR; INTRAVENOUS AS NEEDED
Status: DISCONTINUED | OUTPATIENT
Start: 2020-02-01 | End: 2020-02-01 | Stop reason: HOSPADM

## 2020-02-01 RX ORDER — MEPERIDINE HYDROCHLORIDE 25 MG/ML
12.5 INJECTION INTRAMUSCULAR; INTRAVENOUS; SUBCUTANEOUS AS NEEDED
Status: DISCONTINUED | OUTPATIENT
Start: 2020-02-01 | End: 2020-02-01 | Stop reason: HOSPADM

## 2020-02-01 RX ORDER — HYDROMORPHONE HYDROCHLORIDE 1 MG/ML
0.4 INJECTION, SOLUTION INTRAMUSCULAR; INTRAVENOUS; SUBCUTANEOUS EVERY 5 MIN PRN
Status: DISCONTINUED | OUTPATIENT
Start: 2020-02-01 | End: 2020-02-01 | Stop reason: HOSPADM

## 2020-02-01 RX ADMIN — SODIUM CHLORIDE, SODIUM LACTATE, POTASSIUM CHLORIDE, CALCIUM CHLORIDE: 600; 310; 30; 20 INJECTION, SOLUTION INTRAVENOUS at 12:30:00

## 2020-02-01 RX ADMIN — MIDAZOLAM HYDROCHLORIDE 2 MG: 1 INJECTION INTRAMUSCULAR; INTRAVENOUS at 11:21:00

## 2020-02-01 RX ADMIN — SODIUM CHLORIDE, SODIUM LACTATE, POTASSIUM CHLORIDE, CALCIUM CHLORIDE: 600; 310; 30; 20 INJECTION, SOLUTION INTRAVENOUS at 13:42:00

## 2020-02-01 RX ADMIN — CEFAZOLIN SODIUM/WATER 2 G: 2 G/20 ML SYRINGE (ML) INTRAVENOUS at 11:30:00

## 2020-02-01 RX ADMIN — SODIUM CHLORIDE, SODIUM LACTATE, POTASSIUM CHLORIDE, CALCIUM CHLORIDE: 600; 310; 30; 20 INJECTION, SOLUTION INTRAVENOUS at 11:20:00

## 2020-02-01 NOTE — ANESTHESIA PROCEDURE NOTES
Spinal Block  Performed by: Miryam Camarillo MD  Authorized by: Miryam Camarillo MD       General Information and Staff    Start Time:   Anesthesiologist: Miryam Camarillo MD  Performed by:   Anesthesiologist  Site identification: surface landmarks    Pr

## 2020-02-01 NOTE — ANESTHESIA POSTPROCEDURE EVALUATION
[facility] Patient Status:  Inpatient   Age/Gender 44year old female MRN [id number]   [facility] SURGERY Attending Jasmeet Roth, [address] Day # 1 PCP Arik Prescott MD       Anesthesia Post-op Note    Procedur

## 2020-02-01 NOTE — PLAN OF CARE
Received a phone call from Byhalia at [facility] stating that a white jacket was found in the patient's room.  Patient stated that the jacket was hers, and that she will have a family member  the jacket at [address] [place] [address]

## 2020-02-01 NOTE — BRIEF OP NOTE
Pre-Operative Diagnosis: PT COMING FROM [place]     Post-Operative Diagnosis: PT COMING FROM [facility]      Procedure Performed:   Procedure(s):  LEFT FEMUR, REMOVAL OF HARDWARE, RETROGRADE RODDING    Surgeon(s) and Role:     Nieves Morales MD - Primary

## 2020-02-01 NOTE — PLAN OF CARE
Patient is slow to respond. Patient's mother rode with patient in the ambulance from [facility]. Patient's mother became very upset when patient's  arrived, stating, Denilson Marquis is this man here? \" Asked patient's  to step out of the room fo

## 2020-02-01 NOTE — PLAN OF CARE
Patient transferred from [place] with a distal femur fracture. Will require removal of hardware and retrograde rodding to stabilize the fracture.   Patient scheduled for surgery in the am

## 2020-02-01 NOTE — PLAN OF CARE
Patient being transferred to [facility].   Problem: Patient Centered Care  Goal: Patient preferences are identified and integrated in the patient's plan of care  Description  Interventions:  - What would you like us to know as we care for you?  I live and/or venous puncture sites for bleeding and/or hematoma  - Assess quality of pulses, skin color and temperature  - Assess for signs of decreased coronary artery perfusion - ex.  Angina  - Evaluate fluid balance, assess for edema, trend weights  Outcome: P Problem: SKIN/TISSUE INTEGRITY - ADULT  Goal: Skin integrity remains intact  Description  INTERVENTIONS  - Assess and document risk factors for pressure ulcer development  - Assess and document skin integrity  - Monitor for areas of redness and/or skin b

## 2020-02-01 NOTE — ANESTHESIA PROCEDURE NOTES
Regional Block  Performed by: Lyndon Rose MD  Authorized by: Lyndon Rose MD       General Information and Staff    Start Time:  2/1/2020 1:40 PM  End Time:  2/1/2020 1:46 PM  Anesthesiologist:  Lyndon Rose MD  Patient Location:  OR      S

## 2020-02-01 NOTE — PLAN OF CARE
Patient admitted via ambulance from [facility] to [address] [place] [address]. Accompanied by mother. Patient is alert and oriented to person, place, time and situation. On room air with continuous pulse oximetry monitoring. Saline lock noted.  Celestino fallon

## 2020-02-01 NOTE — ANESTHESIA PREPROCEDURE EVALUATION
PRE-OP EVALUATION    Patient Name: Caleb Kayser    Pre-op Diagnosis: PT COMING FROM [place]    Procedure(s):  LEFT FEMUR, REMOVAL OF HARDWARE, RETROGRADE RODDING    Surgeon(s) and Role:     Mercedes Latif MD - Primary    Pre-op vitals reviewed.   Tem solution 2.5 mg, 2.5 mg, Nebulization, Q6H PRN  Fluticasone Furoate-Vilanterol (BREO ELLIPTA) 200-25 MCG/INH inhaler 1 puff, 1 puff, Inhalation, Daily  Montelukast Sodium (SINGULAIR) tab 10 mg, 10 mg, Oral, Nightly  ceFAZolin sodium (ANCEF/KEFZOL) 2 GM/20M Date    INR 0.98 02/01/2020         Airway      Mallampati: II  Mouth opening: >3 FB  TM distance: > 6 cm  Neck ROM: full Cardiovascular    Cardiovascular exam normal.         Dental    No notable dental history.          Pulmonary    Pulmonary exam normal.

## 2020-02-01 NOTE — CM/SW NOTE
Received call from RN, stating patients mother is roaming halls and entering other patient's rooms. Nursing supervisor advised RN call SW. CM advised RN call family to  patient's mother. She ultimately left with other daughter Garry Halls.  It is likely

## 2020-02-01 NOTE — PLAN OF CARE
Minimal pain pre op to left leg  Post op pain well controlled  Orders for partial weight bearing  Cast to LLE  Bleeding noted post op to left upper incision. Pressure dressing applied. Paged Dr. Avila Situ at 1600.  No call back  Continued to monitor area for bl

## 2020-02-01 NOTE — PLAN OF CARE
Patient's mother was found wandering the hallway by another RN, Jennifer Barrett, and was not able to verbalize why she was here or who she was looking for or if she was a patient at this hospital. This RN accompanied the patient's mother back to the patient's room and

## 2020-02-01 NOTE — H&P
[facility] Hospitalist H&P       CC: transfer from [facility] for L femur fx    PCP: Odalys Salgado MD    History of Present Illness: Pt is a 45 yo with asthma, hx L femur fx at age 11 requiring surgery, morbid obesity with BMI 41 who is admitted as transfer Neck: Supple, symmetrical   Lungs: No wheezing, diminished. Normal effort   Chest wall:  No tenderness or deformity. Heart:  Regular rate and rhythm, S1, S2 normal,no LE edema   Abdomen:   Soft, non-tender.  Bowel sounds normal. . Non distended, no ov 1/30/2020 at 18:49     Approved by (CST): La Galvin MD on 1/30/2020 at 18:54           other imaging reviewed -see epic    ASSESSMENT / PLAN:       43 yo with asthma, hx L femur fx at age 11 requiring surgery, morbid obesity with BMI 41 who is admitte

## 2020-02-01 NOTE — PROGRESS NOTES
Received called from Donte, nursing coordinator for [facility].     Patient being transferred via [address] [place] [address]. Going to Room: 382 Ortho/Spine Unit. Will call report to [facility]   # [phone]. Family aware.

## 2020-02-01 NOTE — PROGRESS NOTES
FAMILY SOCIAL ISSUES    Per night nurse  Patient's mother annat came with patient via ambulance from [place] [address]  When patients  arrived mother did not recognize  (mother lives with patient and )  Luzmaria Heller (mother) was found wondering halls if patient has any mental disability issues we should be aware about. Dimitrios Agarwal stated Servando Hairston was slow as a child\"      also noted to have tremors to hands that he did not before.  When asked if he was ok he stated \"I just have not taken my medication

## 2020-02-01 NOTE — H&P
[facility]    PATIENT'S NAME: Aaliyah Thao   ATTENDING PHYSICIAN: Rea Olvera.  Scotty Aguilar MD   PATIENT ACCOUNT#:   [id number]    LOCATION:  OR [facility] OR [facility]  MEDICAL RECORD #:   [id number]       YOB: 1970  ADMISSION DATE: She is in moderate distress. She is overweight. She has a BMI of 41. 13. VITAL SIGNS:  Stable. The patient is 5 feet 2 inches. She weighs 225 pounds. HEENT:  Unremarkable. LUNGS:  Clear. HEART:  Normal S1, S2 without murmur. ABDOMEN:  Benign.   EXTR

## 2020-02-01 NOTE — PLAN OF CARE
Plan of care explained and updated with patient input. Progressing per plan of care. Patient is alert and oriented to person, place, time and situation. Mother and  at bedside. IVF infusing as ordered. Tirado draining clear/yellow urine.  NPO status m

## 2020-02-02 LAB
DEPRECATED RDW RBC AUTO: 40.6 FL (ref 35.1–46.3)
ERYTHROCYTE [DISTWIDTH] IN BLOOD BY AUTOMATED COUNT: 13.2 % (ref 11–15)
HCT VFR BLD AUTO: 33 % (ref 35–48)
HGB BLD-MCNC: 10.4 G/DL (ref 12–16)
MCH RBC QN AUTO: 27 PG (ref 26–34)
MCHC RBC AUTO-ENTMCNC: 31.5 G/DL (ref 31–37)
MCV RBC AUTO: 85.7 FL (ref 80–100)
PLATELET # BLD AUTO: 191 10(3)UL (ref 150–450)
POTASSIUM SERPL-SCNC: 3.9 MMOL/L (ref 3.5–5.1)
RBC # BLD AUTO: 3.85 X10(6)UL (ref 3.8–5.3)
WBC # BLD AUTO: 9.9 X10(3) UL (ref 4–11)

## 2020-02-02 PROCEDURE — 97530 THERAPEUTIC ACTIVITIES: CPT

## 2020-02-02 PROCEDURE — 97161 PT EVAL LOW COMPLEX 20 MIN: CPT

## 2020-02-02 PROCEDURE — 84132 ASSAY OF SERUM POTASSIUM: CPT | Performed by: ORTHOPAEDIC SURGERY

## 2020-02-02 PROCEDURE — 97535 SELF CARE MNGMENT TRAINING: CPT

## 2020-02-02 PROCEDURE — 85027 COMPLETE CBC AUTOMATED: CPT | Performed by: ORTHOPAEDIC SURGERY

## 2020-02-02 PROCEDURE — 97166 OT EVAL MOD COMPLEX 45 MIN: CPT

## 2020-02-02 NOTE — PLAN OF CARE
Received in bed resting. Discussed plan of care. Medicated for pain with scheduled pain medication. Left lower leg in short cast. Left upper leg with pressure dressing and ace bandage on. Right foot has heel protector on.  Instructed to call for all needs a

## 2020-02-02 NOTE — PROGRESS NOTES
Post Op Day 1 Ortho Note: Left Femur, removal of hardware, retrograde rodding    Status Post Nerve Block:  Type of Nerve Block: Left Femoral  Single Injection Nerve Block    Post op review: No evidence of immediate block related complications, No paresthes

## 2020-02-02 NOTE — OCCUPATIONAL THERAPY NOTE
OCCUPATIONAL THERAPY EVALUATION - INPATIENT     Room Number: 382/382-A  Evaluation Date: 2/2/2020  Type of Evaluation: Initial  Presenting Problem: s/p L hardware removal and retrograde rodding 2/1/2020     Physician Order: IP Consult to Occupational Thera leg  Management Techniques: Activity promotion; Body mechanics;Breathing techniques;Relaxation;Repositioning    COGNITION  Overall Cognitive Status:  WFL - within functional limits    VISION  Current Vision: no visual deficits    PERCEPTION  Overall Percept RW and following WB status with mod assist, therapist assist pt to complete donning of shoes with max assist.  Extensive education provided to family about D/C rec, questions asked all answered.   Patient End of Session: Up in chair;Needs met;Call light wit techniques;ADL training;IADL training;Continued evaluation; Compensatory technique education;Equipment eval/education;Patient/Family training;Patient/Family education; Endurance training;UE strengthening/ROM; Functional transfer training  Rehab Potential : Go

## 2020-02-02 NOTE — PROGRESS NOTES
[facility]  Orthopedic Surgery  Progress Note    Beatrice Pimentel Patient Status:  Inpatient    1970 MRN [id number]   [facility] 3SW-A Attending Rocio Wright, *   Hosp Day # 2 PCP Latoya Lozano MD     SUBJECTIVE:  IN management      REGAN Murphy  2/2/2020  9:02 AM

## 2020-02-02 NOTE — PROGRESS NOTES
[facility] Hospitalist Progress Note     PCP: Akilah Jin MD    CC:  Follow up    SUBJECTIVE:  Had removal of hardware and rodding of L femur yesterday. Pain manageable. No cp/sob/n/v/f/c. Tiardo with yellow urine. +flatus    OBJECTIVE:  Temp:  [97.8 °F (36. PEG 3350, magnesium hydroxide, bisacodyl, Fleet Enema, ondansetron HCl, tiZANidine HCl, acetaminophen **OR** HYDROcodone-acetaminophen **OR** HYDROcodone-acetaminophen, Metoclopramide HCl, morphINE sulfate **OR** morphINE sulfate **OR** morphINE sulfat

## 2020-02-02 NOTE — PHYSICAL THERAPY NOTE
PHYSICAL THERAPY EVALUATION - INPATIENT     Room Number: 382/382-A  Evaluation Date: 2/2/2020  Type of Evaluation: Initial  Physician Order: PT Eval and Treat    Presenting Problem: s/p L hardware removal and retrograde rodding 2/1/2020   Reason for None  Fall Risk: Standard fall risk    WEIGHT BEARING RESTRICTION  Weight Bearing Restriction: L lower extremity           L Lower Extremity: Partial Weight Bearing    PAIN ASSESSMENT  Ratin  Location: L LE  Management Techniques: Activity promotion; Re LE)  Stoop/Curb Assistance: Not tested  Comment : above defined by FIM score    Skilled Therapy Provided: per RN pt ok to be seen. Pt received sitting in bedside chair and agreeable to therapy.   Pt  and mother in room during eval.  Pt educated on g following impairments decrease strength, decrease functional mobility skills, decrease balance, and abnormal gait. Based on this evaluation, patient's clinical presentation is stable and overall the evaluation complexity is considered low.   These impairme

## 2020-02-03 PROBLEM — Z47.89 ORTHOPEDIC AFTERCARE: Status: ACTIVE | Noted: 2020-02-03

## 2020-02-03 LAB
DEPRECATED RDW RBC AUTO: 39.3 FL (ref 35.1–46.3)
ERYTHROCYTE [DISTWIDTH] IN BLOOD BY AUTOMATED COUNT: 13.1 % (ref 11–15)
HCT VFR BLD AUTO: 32.2 % (ref 35–48)
HGB BLD-MCNC: 10.4 G/DL (ref 12–16)
MCH RBC QN AUTO: 27 PG (ref 26–34)
MCHC RBC AUTO-ENTMCNC: 32.3 G/DL (ref 31–37)
MCV RBC AUTO: 83.6 FL (ref 80–100)
PLATELET # BLD AUTO: 185 10(3)UL (ref 150–450)
RBC # BLD AUTO: 3.85 X10(6)UL (ref 3.8–5.3)
WBC # BLD AUTO: 10.7 X10(3) UL (ref 4–11)

## 2020-02-03 PROCEDURE — 97530 THERAPEUTIC ACTIVITIES: CPT

## 2020-02-03 PROCEDURE — 97535 SELF CARE MNGMENT TRAINING: CPT

## 2020-02-03 PROCEDURE — 85027 COMPLETE CBC AUTOMATED: CPT | Performed by: HOSPITALIST

## 2020-02-03 NOTE — CM/SW NOTE
02/03/20 1100   CM/SW Referral Data   Referral Source Social Work (self-referral)   Reason for Referral Discharge planning;Psychoscial assessment   Informant Patient;Spouse   Patient Info   Patient's Mental Status Alert;Oriented   Patient's Home Environ [address] [facility]  Discharge Planner  [phone]

## 2020-02-03 NOTE — OPERATIVE REPORT
[address] [place]    PATIENT'S NAME: Araceli Oakesciera   ATTENDING PHYSICIAN: Charissa Rushing. Estephanie Jon MD   OPERATING PHYSICIAN: Joe Harding M.D.    PATIENT ACCOUNT#:   [de-identified]    LOCATION:  [address] [place] [address]  MEDICAL RECORD #:   [id number]       DATE OF BIRTH: lateralis, exposing the retained hardware along the lateral aspect of the femoral shaft. There was some bony overgrowth over the hardware. An osteotome had to remove the bony overgrowth.   The patient had Rj screws, and I used the Universal screw tray length, 6.0 mm in diameter. The more proximal screw was 50 mm in length, 6.0 mm in diameter. I then placed an oblique screw from lateral to medial, from proximal to distal 70 mm in diameter. I then locked the alessandro proximally.   I used the radiolucent dril

## 2020-02-03 NOTE — PLAN OF CARE
RECD ALERT ,AWAKE ORIENTED NO RESP DISTRESS. ABLE TO WIGGLE TOES, UP WITH O.T. CALL LIGHT W/IN REACH INSTRUCTED IS USE X10/HR WA AND DB EXERCISE. TO CALL FOR ALL NEEDS AND ASSISTANCE.

## 2020-02-03 NOTE — PLAN OF CARE
Pt AOX4. C/o mild pain on her LLE. Has a plaster cast + Aquacel x 3. Denies any numbness/tingling. Ice pack PRN. Pain management plan explained. Ankle pumps, IS encouraged. Celestino - will be dc'd in am. PT/OT. Plan is AMIE when ready. LLE elevated.  Call light

## 2020-02-03 NOTE — PHYSICAL THERAPY NOTE
Attempted to see Pt this AM - RN aware of attempt. Pt currently occupied with workers comp paperwork. Pt already up to chair with OT earlier in AM.  Will f/u later today if time permits, after all other patients are attempted per tentative schedule. Called pt and informed. Pt voiced understanding.

## 2020-02-03 NOTE — PAYOR COMM NOTE
--------------  ADMISSION REVIEW     Payor: WORKERS COMP  Subscriber #:  [id number]  Authorization Number: [id number]    Admit date: 1/31/20  Admit time: 2121       Admitting Physician: Rajesh Ontiveros MD  Attending Physician:  Marvel Hawthorne HISTORY OF PRESENT ILLNESS:  This is a 12-year-old female who presented to [facility] following a slip and fall at work. She works at [address] [place] [address] in [employer]. She slipped. Her left leg slipped out to the side.   She had diffuse pain a EXTREMITIES:  Significant for pain and discomfort at the left ankle and also obvious pain and discomfort in the left distal thigh. IMPRESSION:  Acute fracture, left distal femur.   PLAN:  Removal of hardware and left retrograde rodding at [facility] on 02/01 OPERATIVE TECHNIQUE:  The patient was brought to the operating room, placed on the operating table in supine position. General anesthesia was induced by Dr. Vera Toth. The patient did receive prophylactic antibiotics.   The patient's left lower extrem medullary canal.  I had to pin the guidewire to pass it into the proximal fracture fragment. I used a reduction clamp to reduce the fracture.   I then reamed over the guidewire with the initiating reamer distally and then a series of reamers proximally and 0 and 2-0 Vicryl suture. Skin was closed with staples. The stab incisions for the interlocking screws were closed with staples. Aquacel dressing, ABDs, 4x4s, Kerlix, and Aces were applied.   For the nondisplaced distal fibular fracture, a short leg cast HCT 39.5 33.0*   MCV 83.7 85.7   MCH 27.5 27.0   MCHC 32.9 31.5   RDW 13.2 13.2   NEPRELIM 6.55  --    WBC 9.5 9.9   .0 191.0     Lab 01/31/20  0605 02/01/20  0502   PTP 13.5 13.4   INR 1.05 0.98      ASSESSMENT/PLAN:  1.  Continue pain management  2   46 yo with asthma, hx L femur fx at age 11 requiring surgery, morbid obesity with BMI 41 who is admitted as transfer from [place] for L femur fx.      **L distal femur fx with hx of fx as a teenager with hardware (see imaging) s/p removal of hardware a 1955-Given      0929-Given   2100        docusate sodium (COLACE) cap 100 mg   Dose: 100 mg  Freq: 2 times daily Route: OR  Start: 01/31/20 2130 End: 02/01/20 1518 2245-Given        (0900)-Not Given   1118-MAR Hold     1456-MAR Unhold   1518-D/C'd 1456-MAR Unhold      1807-Given   2148-Given      (0528)-Not Given         Or  HYDROcodone-acetaminophen (NORCO) 5-325 MG per tab 1 tablet   Dose: 1 tablet  Freq: Every 4 hours PRN Route: OR  PRN Reason: moderate pain  PRN Comment: pain  Start: 01/31/20 2 HYDROmorphone HCl (DILAUDID) 1 MG/ML injection 0.8 mg   Dose: 0.8 mg  Freq: Every 2 hour PRN Route: IV  PRN Reason: severe pain  Start: 02/01/20 1456 End: 02/01/20 1748     1748-D/C'd          HYDROmorphone HCl (DILAUDID) 1 MG/ML injection 0.4 mg   Dose: 0

## 2020-02-03 NOTE — PROGRESS NOTES
[facility] Hospitalist Progress Note     Nelsonlinda Howe Patient Status:  Inpatient    1970 MRN [id number]   [facility] Valentino Papas, *   Hosp Day # 3 PCP Mally Flynn MD     CC: follow up    SUBJECTIVE:  Juan Hernandez 118 over Montelukast Sodium  10 mg Oral Nightly     Continuous Infusing Medication:  • Dextrose-NaCl 83 mL/hr at 02/01/20 1646   • sodium chloride Stopped (02/01/20 1647)     PRN Medication:PEG 3350, magnesium hydroxide, bisacodyl, Fleet Enema, ondansetron HCl, tiZ

## 2020-02-03 NOTE — CONSULTS
[facility] Patient Status:  Inpatient    1970 MRN [id number]   [facility] 3SW-A Attending Lisa Ventura, *   Hosp Day # 3 PCP Ramone Lopez MD     Patient Identification  Valentin Álvarez is a 46 year o Mother who has Alzheimer's dementia. They live in [place] at the mother's home.   Home Environment / Accessibility: 1-story house/ trailer, number of outside stairs: 8  Current Functional Status: Maximal assist lower extremity dressing bathing and toileting (NORCO) 5-325 MG per tab 2 tablet, 2 tablet, Oral, Q4H PRN  Metoclopramide HCl (REGLAN) injection 10 mg, 10 mg, Intravenous, Q8H PRN  morphINE sulfate (PF) 4 MG/ML injection 1 mg, 1 mg, Intravenous, Q2H PRN    Or  morphINE sulfate (PF) 4 MG/ML injection 2 Hearing intact. Lips, mucosa, and tongue normal. Teeth and gums normal. Moist mucous membranes. Neck: No neck masses or thyroid enlargement/tenderness/nodules. Lungs:   Resonant, clear breath sounds, quiet accessory muscles.    Chest wall:  No ten consequent functional impairments.     Risk for thromboembolic disease with need for careful DVT prophylaxis, potential for bleeding and hematoma or hemarthrosis (and aggravating anemia) with anticoagulation    PLAN:                   She lives in a home wi

## 2020-02-04 PROCEDURE — 97112 NEUROMUSCULAR REEDUCATION: CPT

## 2020-02-04 PROCEDURE — 97116 GAIT TRAINING THERAPY: CPT

## 2020-02-04 PROCEDURE — 97530 THERAPEUTIC ACTIVITIES: CPT

## 2020-02-04 RX ORDER — PSEUDOEPHEDRINE HCL 30 MG
100 TABLET ORAL 2 TIMES DAILY
Qty: 30 CAPSULE | Refills: 0 | Status: SHIPPED | OUTPATIENT
Start: 2020-02-04

## 2020-02-04 NOTE — PROGRESS NOTES
[facility] [facility] Patient Status:  Inpatient    1970 MRN [id number]   [facility] 3SW-A Attending Johnathon Harris, *   Hosp Day # 3 PCP Glendy Davidson MD     Joni Kramer is a 44year old female patient.     Kalee Womack

## 2020-02-04 NOTE — PHYSICAL THERAPY NOTE
PHYSICAL THERAPY TREATMENT NOTE - INPATIENT    Room Number: 382/382-A     Session: 1  Number of Visits to Meet Established Goals: 4    Presenting Problem: s/p L hardware removal and retrograde rodding 2/1/2020   History related to current admission: 51-ye (including adjusting bedclothes, sheets and blankets)?: A Little   -   Sitting down on and standing up from a chair with arms (e.g., wheelchair, bedside commode, etc.): A Little   -   Moving from lying on back to sitting on the side of the bed?: A Little female admitted on 1/31/2020 for removal of hardware L femur and retrograde rodding due to comminuted fracture of distal femur sustained after fall at work.   Pertinent comorbidities and personal factors impacting therapy include asthma, hx lf L femur fx re

## 2020-02-04 NOTE — PLAN OF CARE
RECD ALERT ,AWAKE, ORIENTED. COMFORTABLE. NO RESP DISTRESS. ENC WIGGLE TOES. LLE ELEVATED ON PILLOWS.  TO CALL FOR ALL NEEDS AND ASSISTANCE

## 2020-02-04 NOTE — PLAN OF CARE
PER PATHOLOGY STAFF WILL CALL PT  OR SPOUSE IF READY TO  HARDWARE, PT AND SPOUSE AWARE OF PLAN. SPOUSE REQ TO TALK TO SW FOR D/C PLANNING. SW INFORMED

## 2020-02-04 NOTE — CM/SW NOTE
NICOLE spoke with patient and  to discuss discharge plan. Brigitte has agreed to [facility] for [address]. Referral sent and facility is able to accept patient once authorization is received from [facility]. Medical information faxed to #[phone].  NICOLE

## 2020-02-04 NOTE — PROGRESS NOTES
[facility] Hospitalist Progress Note     Vin Sharp Patient Status:  Inpatient    1970 MRN [id number]   [facility] 3SW-A Attending Alix Roberts MD   Hosp Day # 4 PCP Delta Pelaez MD     CC: follow up    SUBJECTIVE:  JEANETTE overnight Continuous Infusing Medication:  • Dextrose-NaCl 83 mL/hr at 02/01/20 1646   • sodium chloride Stopped (02/01/20 1647)     PRN Medication:PEG 3350, magnesium hydroxide, bisacodyl, Fleet Enema, ondansetron HCl, tiZANidine HCl, acetaminophen **OR** HYDRO

## 2020-02-04 NOTE — PLAN OF CARE
Pt AOX4. Denies any numbness/tingling on the LLE. Cast on the left ankle. Aquacel x 2 + 1 Coverlet dressing. Ice Pack PRN. Partial weight bearing maintained. Pain management plan explained. Ankle pumps, IS encouraged. Last BM 2/3. Voids per BSC.  Up mod x 2

## 2020-02-04 NOTE — OCCUPATIONAL THERAPY NOTE
OCCUPATIONAL THERAPY TREATMENT NOTE - INPATIENT     Room Number: 382/382-A  Session: 2/5  Number of Visits to Meet Established Goals: 5    Presenting Problem: s/p L hardware removal and retrograde rodding 2/1/2020      History related to current admission: brushing teeth?: None  -   Eating meals?: None    AM-PAC Score:  Score: 18  Approx Degree of Impairment: 46.65%  Standardized Score (AM-PAC Scale): 38.66  CMS Modifier (G-Code): CK    FUNCTIONAL TRANSFER ASSESSMENT  Supine to Sit : Minimum assistance  Sit transfer from sit to stand:  with supervision  Patient will transfer to toilet:  with supervision     UE Exercise Program Goal  Patient will be supervision with bilateral AROM HEP (home exercise program).      Additional Goals:  Pt will verbalize at least 3

## 2020-02-05 PROCEDURE — 97535 SELF CARE MNGMENT TRAINING: CPT

## 2020-02-05 PROCEDURE — 97530 THERAPEUTIC ACTIVITIES: CPT

## 2020-02-05 PROCEDURE — 97116 GAIT TRAINING THERAPY: CPT

## 2020-02-05 NOTE — PHYSICAL THERAPY NOTE
PHYSICAL THERAPY TREATMENT NOTE - INPATIENT    Room Number: 382/382-A     Session: 2  Number of Visits to Meet Established Goals: 4    Presenting Problem: s/p L hardware removal and retrograde rodding 2/1/2020   History related to current admission: 51-ye currently have. ..  -   Turning over in bed (including adjusting bedclothes, sheets and blankets)?: A Little   -   Sitting down on and standing up from a chair with arms (e.g., wheelchair, bedside commode, etc.): A Little   -   Moving from lying on back to worker    ASSESSMENT   Patient is a 44year old female admitted on 1/31/2020 for removal of hardware L femur and retrograde rodding due to comminuted fracture of distal femur sustained after fall at work.   Pertinent comorbidities and personal factors impac

## 2020-02-05 NOTE — PAYOR COMM NOTE
--------------  CONTINUED STAY REVIEW    Payor: WORKERS COMP  Subscriber #:  [id number]  Authorization Number: [id number]    Admit date: 1/31/20  Admit time: 2121    Admitting Physician: Keron Franklin MD  Attending Physician:  Violet Clemente Patient is [phone] year old female admitted on 1/31/2020 for removal of hardware L femur and retrograde rodding due to comminuted fracture of distal femur sustained after fall at work.  Pertinent comorbidities and personal factors impacting therapy include as OT Treatment Plan: Balance activities; Energy conservation/work simplification techniques;ADL training;IADL training;Continued evaluation; Compensatory technique education;Equipment eval/education;Patient/Family training;Patient/Family education; Endurance tr Potassium Chloride ER (K-DUR M20) CR tab 40 mEq   Dose: 40 mEq  Freq:  Once Route: OR  Start: 02/01/20 1530 End: 02/01/20 1646          rivaroxaban (XARELTO) tab 10 mg   Dose: 10 mg  Freq: Every 24 hours Route: OR  Start: 02/01/20 2000 1955-Given     Fleet Enema (FLEET) 7-19 GM/118ML enema 133 mL   Dose: 1 enema  Freq:  Once as needed Route: RE  PRN Reason: constipation  Start: 02/01/20 1456          influenza vaccine split quad (FLULAVAL) ages 6 months to 59 years inj 0.5ml   Dose: 0.5 mL  Freq: Prior

## 2020-02-05 NOTE — PLAN OF CARE
2/4 2000: Pt received AOx4. Room air. L hand saline lock. L leg dressing, cast D/I.  R leg SCD.  2300: Ambulated in the bathroom-tolerated. 2/5 0000: L thigh dressing changed. 0430: Pt sleeping comfortably. VSS. [place] for pain.      Problem: PAIN - ADULT mobility/gait  Description  Interventions:  - Assess patient's functional ability and stability  - Promote increasing activity/tolerance for mobility and gait  - Educate and engage patient/family in tolerated activity level and precautions  - Recommend use

## 2020-02-05 NOTE — PLAN OF CARE
Pain well controlled with PO medications. Pt partial WB to left leg with surgical shoe and walker. Min-mod assist x1 for transfers and ambulation. Dressing to thigh changed as needed, small serous drainage present at sites. Cast to left ankle CDI.  [address]

## 2020-02-05 NOTE — CM/SW NOTE
Spoke with Katrina Reyes,  with [facility] ([phone]) who confirmed receipt of clinical information sent by CM yesterday.   She stated claim request is still in pending status and they are waiting for additional information from pt's employer prior

## 2020-02-05 NOTE — OCCUPATIONAL THERAPY NOTE
OCCUPATIONAL THERAPY TREATMENT NOTE - INPATIENT     Room Number: 382/382-A  Session: 3/5  Number of Visits to Meet Established Goals: 5    Presenting Problem: s/p L hardware removal and retrograde rodding 2/1/2020      History related to current admission: None  -   Eating meals?: None    AM-PAC Score:  Score: 18  Approx Degree of Impairment: 46.65%  Standardized Score (AM-PAC Scale): 38.66  CMS Modifier (G-Code): CK    FUNCTIONAL TRANSFER ASSESSMENT  Supine to Sit : Not tested  Sit to Stand: Minimum assista eval/education;Patient/Family training;Patient/Family education; Endurance training;UE strengthening/ROM; Functional transfer training  Rehab Potential : Good  Frequency (Obs): 5x/week      OT Goals: ongoing 2/5  ADL Goals   Patient will perform upper body d

## 2020-02-05 NOTE — PROGRESS NOTES
[facility] Patient Status:  Inpatient    1970 MRN [id number]   [facility] 3SW-A Attending Kat Brooks MD   [facility] Day # 5 PCP MD Juan Brandon Abler is a 44year old female patient.     Patient

## 2020-02-05 NOTE — PROGRESS NOTES
[facility] Hospitalist Progress Note     Belia Queen Patient Status:  Inpatient    1970 MRN [id number]   [facility] 3SW-A Attending Andie Murillo MD   Hosp Day # 5 PCP Saul Loyd MD     CC: follow up    SUBJECTIVE:  JEANETTE overnight Montelukast Sodium  10 mg Oral Nightly     Continuous Infusing Medication:  • Dextrose-NaCl 83 mL/hr at 02/01/20 1646   • sodium chloride Stopped (02/01/20 1647)     PRN Medication:PEG 3350, magnesium hydroxide, bisacodyl, Fleet Enema, ondansetron HCl, tiZ

## 2020-02-06 PROCEDURE — 97116 GAIT TRAINING THERAPY: CPT

## 2020-02-06 PROCEDURE — 97110 THERAPEUTIC EXERCISES: CPT

## 2020-02-06 NOTE — PLAN OF CARE
ALERT ,AWAKE, ORIENTED. SPOUSE AT BS. SEE MAR. ABLE TO WIGGLE TOES.  SHORT LEG CAST IN PLACE ELEVATED ON PILLOWS CALL LIGHT W/IN REACH INSTRUCTED TO CALL FOR ALL NEEDS AND ASSISTANCE

## 2020-02-06 NOTE — PROGRESS NOTES
[facility] Hospitalist Progress Note     Belia Queen Patient Status:  Inpatient    1970 MRN [id number]   [facility] 3SW-A Attending Andie Murillo MD   Hosp Day # 6 PCP Saul Loyd MD     CC: follow up    SUBJECTIVE:  Feels okay. 10 mg Oral Nightly     Continuous Infusing Medication:  • Dextrose-NaCl 83 mL/hr at 02/01/20 1646   • sodium chloride Stopped (02/01/20 1647)     PRN Medication:PEG 3350, magnesium hydroxide, bisacodyl, Fleet Enema, ondansetron HCl, tiZANidine HCl, acetami

## 2020-02-06 NOTE — CM/SW NOTE
Approval for AMIE coverage through worker's comp still pending. SW contacted NH facilities to attempt to identify accepting provider once [facility] is approved. Spoke with admissions at [facility] - they do not accept  cases at all.   Spoke with May Lopez

## 2020-02-06 NOTE — PHYSICAL THERAPY NOTE
PHYSICAL THERAPY TREATMENT NOTE - INPATIENT    Room Number: 382/382-A     Session: 3  Number of Visits to Meet Established Goals: 4    Presenting Problem: s/p L hardware removal and retrograde rodding 2/1/2020   History related to current admission: 51-ye (including adjusting bedclothes, sheets and blankets)?: A Little   -   Sitting down on and standing up from a chair with arms (e.g., wheelchair, bedside commode, etc.): A Little   -   Moving from lying on back to sitting on the side of the bed?: A Little therapy include asthma, hx lf L femur fx requiring surgery at age of 11, obesity. Pt continues to present with impaired strength and decreased ROM LLE , decreased endurance and impaired balance below PLOF  .   Pt will continue to benefit from ongoing IP

## 2020-02-06 NOTE — PLAN OF CARE
Pt. Admitted for L distal femur fx from fall at work on 01-31-20, sx by Dr. Divya Ambrose on 02-01-20, POD 5. Pain level is well controlled on oral pain meds. Ambulates with walker and min/mod assist, partial w/b with cast shoe.  LLE with hard cast. Denies numbne

## 2020-02-07 NOTE — PROGRESS NOTES
[address] [facility] Patient Status:  Inpatient    1970 MRN [id number]   [facility] 3SW-A Attending Vincent Bach MD   [facility] Day # 7 PCP MD Elle Rhoadesaj Salvador is a 44year old female patient.     Patient Acti

## 2020-02-07 NOTE — PLAN OF CARE
Pt up and ambulating well with min assist and walker. PWB to LLE with post op shoe on. Hard cast to LLE. RA, ERROL. Xarelto. Coverlet and aquacel to incision sites to thigh. Awaiting placement per insurance and AMIE. Will continue to monitor.

## 2020-02-07 NOTE — CM/SW NOTE
Message left for Annmarie Villagomez,  with [facility] ([phone]) to try to obtain update on status of WC case. Medicaid application completed with [address] today. Application sent to [facility] via [employer].   Spoke with their liaison

## 2020-02-07 NOTE — DIETARY NOTE
[facility] [facility]     Admitting diagnosis:  left hip fracture  Femur fracture (HCC)    Ht:  5'2\"  Wt: 102 kg (224 lb 13.9 oz). This is 204% of IBW  Body mass index is 41.13 kg/m².   IBW: 50kg    Wt Readings from Last 1

## 2020-02-07 NOTE — PLAN OF CARE
Pt. Admitted for L distal femur fx from fall at work on 01-31-20, sx by Dr. Javi Damon on 02-01-20, POD 6. Pain level is well controlled on oral pain meds. Ambulates with walker and min assist, partial w/b with cast shoe.  LLE with hard cast. Denies numbness/tin

## 2020-02-07 NOTE — PAYOR COMM NOTE
--------------  CONTINUED STAY REVIEW    Payor: WORKERS COMP  Subscriber #:  [id number]  Authorization Number: [id number]    Admit date: 1/31/20  Admit time: 2121    Admitting Physician: Taryn Bullock MD  Attending Physician:  Mary Lu MD Intake/Output Summary (Last 24 hours) at 2/6/2020 1149  Last data filed at 2/6/2020 0945      Gross per 24 hour   Intake 350 ml   Output —   Net 350 ml         Exam:  Gen: No acute distress, alert and oriented  Pulm: Lungs clear, normal respiratory effort        Assessment/Plan:      43 yo with asthma, hx L femur fx at age 11 requiring surgery, who is admitted as transfer from [place] for L femur fx.      **L distal femur fx with hx of fx as a teenager with hardware (see imaging) s/p removal of hardware an

## 2020-02-08 PROCEDURE — 97116 GAIT TRAINING THERAPY: CPT

## 2020-02-08 PROCEDURE — 97530 THERAPEUTIC ACTIVITIES: CPT

## 2020-02-08 NOTE — PLAN OF CARE
POD 7 Lt femur rodding and ankle casting, Pt is AAOX4, room air, VSS, Xeralto, Pt up and ambulating well with min assist and walker. PWB to LLE with post op shoe in place. Coverlet and aquacel to incision sites to thigh.  Pain minimal overnight, controlled

## 2020-02-08 NOTE — PROGRESS NOTES
[facility] [address] [place] [address]  Orthopedic Surgery  Progress Note    Joni Kramer Patient Status:  Inpatient    1970 MRN [id number]   [facility] 3SW-A Attending Cyn Zazueta MD   Hosp Day # 8 PCP Glendy Davidson MD     SUBJECTIVE:  INTERVAL HI

## 2020-02-08 NOTE — PROGRESS NOTES
[facility] Hospitalist Progress Note     Elmer Rosa Patient Status:  Inpatient    1970 MRN [id number]   [facility] 3SW-A Attending Camilo Arthur MD   Hosp Day # 8 PCP Cassy Warner MD     CC: follow up    SUBJECTIVE:  JEANETTE overnight  No **OR** morphINE sulfate, influenza virus vaccine PF, albuterol sulfate        Assessment/Plan:     43 yo with asthma, hx L femur fx at age 11 requiring surgery, morbid obesity with BMI 41 who is admitted as transfer from [place] for L femur fx.      **L d

## 2020-02-08 NOTE — PLAN OF CARE
Up in chair  , D/c planning was AMIE but requested to go home w/ HH , seen by PT- [facility],  updated at bedside, Social work aware of plan, see SW notes, d/c poss Monday.

## 2020-02-08 NOTE — PROGRESS NOTES
[facility] Hospitalist Progress Note     Amina Diop Patient Status:  Inpatient    1970 MRN [id number]   [facility] 3SW-A Attending Beltran Kaiser MD   Hosp Day # 7 PCP Ari Winters MD     CC: follow up    SUBJECTIVE:  Pt feels well 1646   • sodium chloride Stopped (02/01/20 1643)     PRN Medication:PEG 3350, magnesium hydroxide, bisacodyl, Fleet Enema, ondansetron HCl, tiZANidine HCl, acetaminophen **OR** HYDROcodone-acetaminophen **OR** HYDROcodone-acetaminophen, Metoclopramide HCl,

## 2020-02-08 NOTE — CM/SW NOTE
RN called today to say that pt's d/c recommendation has changed from rehab to home with HHPT. Pt is agreeable to going home with HHPT.   Since this is a case involving Worker's Comp they will need to be contacted on Monday to find out who would be in netwo

## 2020-02-08 NOTE — PHYSICAL THERAPY NOTE
PHYSICAL THERAPY TREATMENT NOTE - INPATIENT    Room Number: 382/382-A     Session: 4   Number of Visits to Meet Established Goals: 4    Presenting Problem: s/p L hardware removal and retrograde rodding 2/1/2020     Problem List  Active Problems:    Femur Skilled Therapy Provided: Today PT and pt focused on gait and stair training for potential d/c to home vs AMIE pending medical and insurance clearance; Pt completed toileting with Min Assist for LE positioning and hygiene;   Pt ambulated 30 ft x2 with RW Patient is able to demonstrate supine - sit EOB @ level: supervision      Goal #2 Patient is able to demonstrate transfers Sit to/from Stand at assistance level: MET      Goal #3 Patient is able to ambulate 10 feet with assist device: walker - rolling at a

## 2020-02-09 RX ORDER — TRIAMCINOLONE ACETONIDE 1 MG/ML
LOTION TOPICAL 2 TIMES DAILY
Status: DISCONTINUED | OUTPATIENT
Start: 2020-02-09 | End: 2020-02-10

## 2020-02-09 RX ORDER — DIPHENHYDRAMINE HCL 25 MG
25 CAPSULE ORAL EVERY 6 HOURS PRN
Status: DISCONTINUED | OUTPATIENT
Start: 2020-02-09 | End: 2020-02-10

## 2020-02-09 RX ORDER — TRIAMCINOLONE ACETONIDE 1 MG/ML
LOTION TOPICAL 2 TIMES DAILY
Status: DISCONTINUED | OUTPATIENT
Start: 2020-02-09 | End: 2020-02-09

## 2020-02-09 NOTE — PROGRESS NOTES
SPoke with Dr. Curtis Dowell regarding rash on pt's buttocks. She will order benadryl and a triamcinolone lotion.

## 2020-02-09 NOTE — PLAN OF CARE
Assumed care of patient at 7am   AOx4 in NAD  Pain managed with PO norco  RA; lungs clear; productive cough tan sputum; IS  Abdomen soft; BM today  Voids  Up with walker    Lower back, yunier area and back of legs with redness noted; \"itchy per patient\" ac

## 2020-02-09 NOTE — PROGRESS NOTES
[facility] Hospitalist Progress Note     Amina Diop Patient Status:  Inpatient    1970 MRN [id number]   [facility] 3SW-A Attending Chelsey Block MD   Hosp Day # 9 PCP Ari Winters MD     CC: follow up    SUBJECTIVE:  JEANETTE overnight  No **OR** morphINE sulfate, influenza virus vaccine PF, albuterol sulfate        Assessment/Plan:     43 yo with asthma, hx L femur fx at age 11 requiring surgery, morbid obesity with BMI 41 who is admitted as transfer from [place] for L femur fx.      **L d

## 2020-02-10 VITALS
HEART RATE: 86 BPM | SYSTOLIC BLOOD PRESSURE: 139 MMHG | TEMPERATURE: 98 F | WEIGHT: 224.88 LBS | OXYGEN SATURATION: 97 % | RESPIRATION RATE: 18 BRPM | DIASTOLIC BLOOD PRESSURE: 54 MMHG | BODY MASS INDEX: 41 KG/M2

## 2020-02-10 PROCEDURE — 90471 IMMUNIZATION ADMIN: CPT

## 2020-02-10 PROCEDURE — 97530 THERAPEUTIC ACTIVITIES: CPT

## 2020-02-10 PROCEDURE — 97535 SELF CARE MNGMENT TRAINING: CPT

## 2020-02-10 PROCEDURE — 97116 GAIT TRAINING THERAPY: CPT

## 2020-02-10 PROCEDURE — 97110 THERAPEUTIC EXERCISES: CPT

## 2020-02-10 NOTE — PLAN OF CARE
Has tiny scabs on buttock, reports itching, appears to be caused by pt scratching. 1st shift RN obtained order for Kenalog cream, applied when obtained from Pharm. Explained to pt, verbalized understanding. Put back to bed at 1630.

## 2020-02-10 NOTE — PLAN OF CARE
POD 9 Lt femur rodding and ankle casting, Pt is AAOX4, room air, VSS, Xeralto, Pt up and ambulating well with min assist and walker. PWB to LLE with post op shoe in place. Coverlets to incision sites to thigh.  Pain minimal overnight, controlled with PO med

## 2020-02-10 NOTE — PROGRESS NOTES
[facility] Hospitalist Progress Note     Nelson Carley Patient Status:  Inpatient    1970 MRN [id number]   [facility] 3SW-A Attending Maris Zaragoza MD   Hosp Day # 8 PCP Mally Flynn MD     CC: follow up    SUBJECTIVE:  JEANETTE overnight  N acetaminophen **OR** HYDROcodone-acetaminophen **OR** HYDROcodone-acetaminophen, Metoclopramide HCl, morphINE sulfate **OR** morphINE sulfate **OR** morphINE sulfate, influenza virus vaccine PF, albuterol sulfate        Assessment/Plan:     45 yo with asth

## 2020-02-10 NOTE — DISCHARGE SUMMARY
General Medicine Discharge Summary     Patient ID:  Caleb Kayser  44year old  5/12/1970    Admit date: 1/31/2020    Discharge date and time: 2/10/10    Attending Physician: Trent Acharya MD     Primary Care Physician: Andie Spivey MD     Reason for Ad Nondisplaced oblique slightly comminuted fracture of the distal fibula. Intact tibiotalar joint. Moderate lateral malleolar soft tissue swelling.     Dictated by (CST): May Delacruz MD on 1/30/2020 at 19:23     Approved by (CST): May Delacruz MD on 1/ 2/1/2020  CONCLUSION:  Intraoperative fluoroscopic guidance for ORIF left femur as above.    Dictated by: Poonam Milan MD on 2/01/2020 at 13:18     Approved by: Poonam Milan MD on 2/01/2020 at 13:18          Xr Hip W Or Wo Pelvis 2 Or 3 Views, Left (c the following labs/studies: N/A      Total Time Coordinating Care: Greater than 30 minutes    Patient had opportunity to ask questions and state understand and agree with therapeutic plan as outlined above.      Thank Jazmin Covarrubias MD

## 2020-02-10 NOTE — PROGRESS NOTES
[address] [facility] Patient Status:  Inpatient    1970 MRN [id number]   [facility] 3SW-A Attending Chihco Nunez MD   Hosp Day # 10 PCP Ramone Lopez MD     Valentin Álvarez is a 44year old female patient.     Patient Act

## 2020-02-10 NOTE — PLAN OF CARE
Pain controlled. Some numbness to LLE since surgery, able to wiggle toes, able to lift leg off bed, good cap refill. Plaster cast to lower left leg, coverlets CDI to upper left leg. VSS on RA. SCDs on bilaterally. Fall precautions in place. IS encouraged.

## 2020-02-10 NOTE — PHYSICAL THERAPY NOTE
PHYSICAL THERAPY TREATMENT NOTE - INPATIENT    Room Number: 382/382-A     Session: 5  Number of Visits to Meet Established Goals: 4    Presenting Problem: s/p L hardware removal and retrograde rodding 2/1/2020      History related to current admission: 51 hospital room?: A Little   -   Climbing 3-5 steps with a railing?: A Little       AM-PAC Score:  Raw Score: 20   Approx Degree of Impairment: 35.83%   Standardized Score (AM-PAC Scale): 47.67   CMS Modifier (G-Code): CJ    FUNCTIONAL ABILITY STATUS  Gait A D/C    PLAN  PT Treatment Plan: Gait training;Stair training  Rehab Potential : Good  Frequency (Obs): Daily UNTIL D/C TO HOME    CURRENT GOALS     Goal #1 Patient is able to demonstrate supine - sit EOB @ level: supervision  Ongoing       Goal #2 Patient

## 2020-02-10 NOTE — CM/SW NOTE
Met with pt and pt's  to discuss DC Planning. No response received from pt's [place] [employer] [place], however pt/spouse stating that they have seen a letter on their [facility] edvin denying coverage for WC.   Discussed that PT recommendation has changed to [facility] VELAZQUEZ

## 2020-02-10 NOTE — OCCUPATIONAL THERAPY NOTE
OCCUPATIONAL THERAPY TREATMENT NOTE - INPATIENT     Room Number: 382/382-A  Session: 4/5  Number of Visits to Meet Established Goals: 5    Presenting Problem: s/p L hardware removal and retrograde rodding 2/1/2020      History related to current admission: Putting on and taking off regular upper body clothing?: None  -   Taking care of personal grooming such as brushing teeth?: None  -   Eating meals?: None    AM-PAC Score:  Score: 21  Approx Degree of Impairment: 32.79%  Standardized Score (AM-PAC Scale): 4 RAJESH montoya HEP, OT role and care plan. Patient End of Session: Up in chair;Needs met;Call light within reach;RN aware of session/findings; All patient questions and concerns addressed; Family present    ASSESSMENT   Patient seen for OT services this supervision --> MET 2/10/20     UE Exercise Program Goal  Patient will be supervision with bilateral AROM HEP (home exercise program).      Additional Goals:  Pt will verbalize at least 3 energy conservation techniques --> MET 2/10/20  Pt will sit unsupport

## 2020-02-10 NOTE — CM/SW NOTE
Message left for Giancarlo Sutton,  with Eloisa Small ([phone]) to try to obtain update on status of WC case. PT recommendation changed to Teddy Sheldon. / to remain available for support and/or discharge planning.      Nena Nelson

## 2020-02-10 NOTE — PROGRESS NOTES
Spoke with Lauren VALLES, Xarelto switched to ASA for discharge, she is ok that pt is going home without home health today. DCI reviewed with pt and spouse. All questions answered. Flu shot given. IV removed. Norco and ASA Rx's given to patient.  Cleared for dis

## 2020-02-10 NOTE — PLAN OF CARE
Vitals stable, pain well controlled on PO pain meds, moving well with 1 person assist, maintains partial WB to LLE (~ 25%) with FWW and gait belt. Toes warm, wiggles them, denies numbness, uses post op shoe when out of bed. Drsgs to L thigh C/D/I.

## 2020-02-11 NOTE — CM/SW NOTE
02/11/20 0700   Discharge disposition   Expected discharge disposition Home or Self   Discharge transportation Private car   DC 2/10/2020

## 2023-03-01 NOTE — OCCUPATIONAL THERAPY NOTE
OCCUPATIONAL THERAPY TREATMENT NOTE - INPATIENT     Room Number: 382/382-A  Session: 1/5  Number of Visits to Meet Established Goals: 5    Presenting Problem: s/p L hardware removal and retrograde rodding 2/1/2020       History related to current admission teeth?: None  -   Eating meals?: None    AM-PAC Score:  Score: 18  Approx Degree of Impairment: 46.65%  Standardized Score (AM-PAC Scale): 38.66  CMS Modifier (G-Code): CK    FUNCTIONAL TRANSFER ASSESSMENT  Supine to Sit : Moderate assistance  Sit to Stand transfer training  Rehab Potential : Good  Frequency (Obs): 5x/week      OT Goals:   ADL Goals   Patient will perform upper body dressing:  with supervision  Patient will perform lower body dressing:  with supervision  Patient will perform toileting: with Neris fajardo pt's mother

## (undated) DEVICE — CHLORAPREP 26ML APPLICATOR

## (undated) DEVICE — GAMMEX® PI HYBRID SIZE 8.5, STERILE POWDER-FREE SURGICAL GLOVE, POLYISOPRENE AND NEOPRENE BLEND: Brand: GAMMEX

## (undated) DEVICE — Device: Brand: BOOT LINER, DISPOSABLE

## (undated) DEVICE — INTENDED FOR TISSUE SEPARATION, AND OTHER PROCEDURES THAT REQUIRE A SHARP SURGICAL BLADE TO PUNCTURE OR CUT.: Brand: BARD-PARKER ® STAINLESS STEEL BLADES

## (undated) DEVICE — GAUZE SPONGES,12 PLY: Brand: CURITY

## (undated) DEVICE — SOL  .9 1000ML BTL

## (undated) DEVICE — NON-ADHERENT STRIPS,OIL EMULSION: Brand: CURITY

## (undated) DEVICE — Device

## (undated) DEVICE — SUTURE ETHILON 3-0 669H

## (undated) DEVICE — BIT DRL 4.3MM NTR NL FR

## (undated) DEVICE — 3M™ MICROFOAM™ TAPE 1528-4: Brand: 3M™ MICROFOAM™

## (undated) DEVICE — POSITIONER OR KT PT CR

## (undated) DEVICE — BALL NOSE GUIDE WIRE

## (undated) DEVICE — DRAPE C-ARM UNIVERSAL

## (undated) DEVICE — PADDING 4YDX6IN CTTN STRL WBRL

## (undated) DEVICE — TETRA-FLEX CF WOVEN LATEX FREE ELASTIC BANDAGE 6" X 5.5 YD: Brand: TETRA-FLEX™CF

## (undated) DEVICE — REM POLYHESIVE ADULT PATIENT RETURN ELECTRODE: Brand: VALLEYLAB

## (undated) DEVICE — 1010 S-DRAPE TOWEL DRAPE 10/BX: Brand: STERI-DRAPE™

## (undated) DEVICE — BATTERY

## (undated) DEVICE — 3M™ IOBAN™ 2 ANTIMICROBIAL INCISE DRAPE 6648EZ: Brand: IOBAN™ 2

## (undated) DEVICE — SUTURE VICRYL 0 CP-1

## (undated) DEVICE — GAMMEX® PI HYBRID SIZE 9, STERILE POWDER-FREE SURGICAL GLOVE, POLYISOPRENE AND NEOPRENE BLEND: Brand: GAMMEX

## (undated) DEVICE — STERILE POLYISOPRENE POWDER-FREE SURGICAL GLOVES: Brand: PROTEXIS

## (undated) DEVICE — SOL H2O 1000ML BTL

## (undated) DEVICE — PAD SACRAL SPAN AID

## (undated) DEVICE — KENDALL SCD EXPRESS SLEEVES, KNEE LENGTH, MEDIUM: Brand: KENDALL SCD

## (undated) DEVICE — LOWER EXTREMITY: Brand: MEDLINE INDUSTRIES, INC.

## (undated) DEVICE — SUTURE VICRYL 2-0 FS-1

## (undated) DEVICE — HIP PINNING CDS: Brand: MEDLINE INDUSTRIES, INC.

## (undated) NOTE — IP AVS SNAPSHOT
Patient Demographics     Address  [address] [place] [address] Phone  [phone] [facility])  [phone] (Mobile) *Preferred* E-mail Address  [email or web address]      Emergency Contact(s)     Name Relation Home Work [employer] [id number] lactated ringers infusion 01/31/20 0816 New Bag      [id number] morphINE sulfate (PF) 4 MG/ML injection 4 mg 01/30/20 2050 Given            LEFT HAND     Order ID Medication Name Action Time Action Reason Comments    [id number] iopamidol (ISOVUE-M Component Value Reference Range Flag Lab   Magnesium 2.0 1.6 - 2.6 mg/dL [facility] CTR THIEF RVR FALL            PROTHROMBIN TIME (PT) [[id number]] (Normal)  Resulted: 01/31/20 0658, Result status: Final result   Ordering provider:  Alan Walker DO  01/31/20 0218 Re BASIC METABOLIC PANEL (8) [[id number]] (Abnormal)  Resulted: 01/30/20 2126, Result status: Final result   Ordering provider:  Gabriela Reynaga APRN  01/30/20 1952 Resulting lab:  [facility] LAB    Specimen Information    Type Source Collected On   B Procedure Component Value Units Date/Time    MRSA Screen by PCR Once [[id number]]  (Normal) Collected:  01/31/20 0233    Order Status:  Completed Lab Status:  Final result Updated:  01/31/20 1104    Specimen:  Other from [facility]      MRSA Screen By PCR Negat • Heart Disorder Father        Review of Systems  Comprehensive ROS reviewed and negative except for what's stated above.      OBJECTIVE:  /65 (BP Location: Right arm)   Pulse 79   Temp 98.3 °F (36.8 °C) (Oral)   Resp 16   Ht 5' 2\" (1.575 m)   Wt 225 19: 23     Approved by (CST): Su Jewell MD on 1/30/2020 at 19:27          Xr Femur Min 2 Views Left (cpt=73552)    Result Date: 1/30/2020  CONCLUSION:  1.  Comminuted displaced fracture of the distal femoral shaft as discussed above which arises at the - continue breo (in place of advair)  - continue singulari    Pre op  - Patient can complete > 4 mets without chest pain or sob,  - denies sob or chest pain, no prior hs of cardiac disease, no hs of blood clots, no hs of bleeding do  - patient with normal Gerda Lewis is a a(n) 44year old female suffered a mechanical slip and fall at work on the evening of January 30th 2020 with resulting left femur pain/deformity and left ankle pain.   Pain currently 2 out of 10 and intensity and located in left femur a •  [MAR Hold] Normal Saline Flush 0.9 % injection 3 mL, 3 mL, Intravenous, PRN  •  [MAR Hold] acetaminophen (TYLENOL) tab 650 mg, 650 mg, Oral, Q4H PRN **OR** [MAR Hold] HYDROcodone-acetaminophen (NORCO) 5-325 MG per tab 1 tablet, 1 tablet, Oral, Q4H PRN * Vital Signs:  Blood pressure 152/72, pulse 80, temperature 97.9 °F (36.6 °C), temperature source Oral, resp. rate 16, height 5' 2\" (1.575 m), weight 225 lb 11.2 oz (102.4 kg), last menstrual period 01/01/2020, SpO2 96 %.     Laboratory Data:  Recent Labs Discharge Diagnoses: Closed fracture of distal end of left femur, unspecified fracture morphology, initial encounter ([facility] [S72.402A]  Closed fracture of distal end of left fibula, unspecified fracture morphology, initial encounter [H73.520G]  See Addition - further management per ortho     Asthma  - no wheezing or sob  - continue breo (in place of advair)  - continue singular     Pre op  - Patient can complete > 4 mets without chest pain or sob,  - denies sob or chest pain, no prior hs of cardiac disease, n Result Date: 1/31/2020  CONCLUSION:   Comminuted mildly angulated fracture of the distal left femoral metadiaphysis. There is no suspicious osseous lesion given limitations from the adjacent streak artifact.   Plate and screw fixation of a healed mid femo Physical Therapy Notes (last 72 hours) (Notes from 1/28/2020  7:35 PM through 1/31/2020  7:35 PM)    No notes of this type exist for this encounter.      Occupational Therapy Notes (last 72 hours) (Notes from 1/28/2020  7:35 PM through 1/31/2020  7:35 PM)

## (undated) NOTE — IP AVS SNAPSHOT
[facility]            (For Outpatient Use Only) Initial Admit Date: 1/30/2020   Inpt/Obs Admit Date: Inpt: 1/30/20 / Obs: N/A   Discharge Date:    Saundra Machado:  [de-identified]   MRN: [de-identified]   CSN: [id number]   CEID: [id number]        SEY Subscriber ID:  Pt Rel to Subscriber:    Hospital Account Financial Class:  Worker's Comp    January 31, 2020

## (undated) NOTE — LETTER
Azalea Verma [address] [address]  [place], [address] [facility]    Consent for Operation  Date: __________________                                Time: _______________    1.  I authorize the performance upon [employer] the following operation:  Procedure procedure has been videotaped, the surgeon will obtain the original videotape. The hospital will not be responsible for storage or maintenance of this tape.   7. For the purpose of advancing medical education, I consent to the admittance of observers to the STATEMENTS REQUIRING INSERTION OR COMPLETION WERE FILLED IN.     Signature of Patient:   ___________________________    When the patient is a minor or mentally incompetent to give consent:  Signature of person authorized to consent for patient: ____________ supplements, and pills I can buy without a prescription (including street drugs/illegal medications). Failure to inform my anesthesiologist about these medicines may increase my risk of anesthetic complications. iv.  If I am allergic to anything or have ha Anesthesiologist Signature     Date   Time  I have discussed the procedure and information above with the patient (or patient’s representative) and answered their questions. The patient or their representative has agreed to have anesthesia services.     ___